# Patient Record
Sex: FEMALE | Race: WHITE | Employment: UNEMPLOYED | URBAN - METROPOLITAN AREA
[De-identification: names, ages, dates, MRNs, and addresses within clinical notes are randomized per-mention and may not be internally consistent; named-entity substitution may affect disease eponyms.]

---

## 2017-01-31 ENCOUNTER — OFFICE VISIT (OUTPATIENT)
Dept: FAMILY MEDICINE CLINIC | Facility: CLINIC | Age: 47
End: 2017-01-31

## 2017-01-31 ENCOUNTER — HOSPITAL ENCOUNTER (OUTPATIENT)
Dept: GENERAL RADIOLOGY | Age: 47
Discharge: HOME | End: 2017-01-31
Payer: MEDICARE

## 2017-01-31 VITALS
DIASTOLIC BLOOD PRESSURE: 77 MMHG | BODY MASS INDEX: 33.29 KG/M2 | SYSTOLIC BLOOD PRESSURE: 128 MMHG | HEART RATE: 76 BPM | HEIGHT: 64 IN | OXYGEN SATURATION: 97 % | RESPIRATION RATE: 16 BRPM | TEMPERATURE: 98.6 F | WEIGHT: 195 LBS

## 2017-01-31 DIAGNOSIS — F32.A DEPRESSION, UNSPECIFIED DEPRESSION TYPE: ICD-10-CM

## 2017-01-31 DIAGNOSIS — M79.644 PAIN IN FINGER OF RIGHT HAND: ICD-10-CM

## 2017-01-31 DIAGNOSIS — N28.89: ICD-10-CM

## 2017-01-31 DIAGNOSIS — M25.441 SWELLING OF FINGER JOINT OF RIGHT HAND: ICD-10-CM

## 2017-01-31 DIAGNOSIS — M32.9 SLE (SYSTEMIC LUPUS ERYTHEMATOSUS) (HCC): Primary | ICD-10-CM

## 2017-01-31 DIAGNOSIS — M32.19: ICD-10-CM

## 2017-01-31 PROCEDURE — 73130 X-RAY EXAM OF HAND: CPT

## 2017-01-31 RX ORDER — PREDNISONE 10 MG/1
TABLET ORAL
Qty: 20 TAB | Refills: 0 | Status: SHIPPED | OUTPATIENT
Start: 2017-01-31 | End: 2017-02-01 | Stop reason: ALTCHOICE

## 2017-01-31 RX ORDER — ACETAMINOPHEN AND CODEINE PHOSPHATE 300; 30 MG/1; MG/1
TABLET ORAL
Refills: 0 | COMMUNITY
Start: 2017-01-02 | End: 2017-03-21 | Stop reason: SDUPTHER

## 2017-01-31 NOTE — PROGRESS NOTES
Chief Complaint   Patient presents with   [employer] Establish Care    Hand Swelling     right x 3 days     HPI:  New patient establishing care  Was seeing Dr May Parra in past  Sees Dr Dagmar Zamarripa with [facility] for kidney disease associated with lupus and hx of kidney transplant, due to see this month and had recent lab work done  She does not see a rheumatologist as she states the last one did nothing for her  She does follow [facility] for her back and joint pains secondary to her Lupus  She was seeing [facility] and would like to go back to follow for her depression. She has a father and mother who are ill. Mother has cancer, going through a recent divorce and dealing with issues from her childhood. She feels overwhelmed. Denies SI and HI.    3 day hx of right hand and finger pain. Denies trauma or fall. Was playing pool, she is right hand dominant although she does use her left hand to write. She lifts her scooter and walker regularly which has not been any change. She does not need a assistive device today. Pain is 11/10 with swelling and pain he joint has gotten progressively more painful, swollen, and red. She describes a \"burning\" when joint is touched and says the pain is moving towards her hand. She's tried ice, an ACE bandage, and tylenol with codeine with minimal relief. She denies any associated symptoms of nausea, vomiting, fever, or diarrhea. Review of Systems   Constitutional: Negative for chills, fever, malaise/fatigue and weight loss. Eyes: Negative for blurred vision, double vision and photophobia. Respiratory: Negative for cough, shortness of breath and wheezing. Cardiovascular: Negative for chest pain, palpitations and orthopnea. Gastrointestinal: Negative for abdominal pain, constipation, diarrhea, nausea and vomiting. Genitourinary: Negative for dysuria, frequency and urgency. Musculoskeletal: Positive for back pain, falls, joint pain and myalgias.    Skin: Negative for itching and rash. Neurological: Negative for dizziness, tingling, sensory change, speech change, focal weakness and headaches. Psychiatric/Behavioral: Positive for depression. Negative for substance abuse and suicidal ideas. The patient has insomnia. The patient is not nervous/anxious. Allergies   Allergen Reactions    Contrast Dye [Iodine] Other (comments)    Sulfa (Sulfonamide Antibiotics) Itching     Past Medical History   Diagnosis Date    Bed wetting     Depression     Hyperparathyroidism due to renal insufficiency (HCC)     Hypertension     Lupus ([facility] [address] 1995    Lupus erythematosus     Synovitis of knee     Tendonitis of knee, left     Tendonitis of knee, right     UTI (urinary tract infection)      Past Surgical History   Procedure Laterality Date    Hx renal transplant      Hx other surgical       Hemodialysis     Family History   Problem Relation Age of Onset    Hypertension Father     Cancer Father     Hypertension Mother     Diabetes Mother     Stroke Mother     Psychiatric Disorder Mother     Psychiatric Disorder Sister      History   Smoking Status    Never Smoker   Smokeless Tobacco    Not on file     Social History     Social History    Marital status: LEGALLY      Spouse name: N/A    Number of children: N/A    Years of education: N/A     Occupational History    Not on file. Social History Main Topics    Smoking status: Never Smoker    Smokeless tobacco: Not on file    Alcohol use No    Drug use: No    Sexual activity: No     Other Topics Concern    Not on file     Social History Narrative         OBJECTIVE:  Visit Vitals    /77    Pulse 76    Temp 98.6 °F (37 °C)    Resp 16    Ht 5' 4\" (1.626 m)    Wt 195 lb (88.5 kg)    SpO2 97%    BMI 33.47 kg/m2     PHYSICAL EXAM:  Physical Exam   Constitutional: She is oriented to person, place, and time. She appears well-developed and well-nourished.    HENT:   Head: Normocephalic and atraumatic. Right Ear: External ear normal.   Left Ear: External ear normal.   Mouth/Throat: Oropharynx is clear and moist.   Eyes: Conjunctivae are normal. Pupils are equal, round, and reactive to light. Neck: Neck supple. Cardiovascular: Normal rate, regular rhythm and intact distal pulses. Pulmonary/Chest: Effort normal and breath sounds normal. No respiratory distress. She has no wheezes. She has no rales. She exhibits no tenderness. Abdominal: Soft. Bowel sounds are normal. She exhibits no distension. There is no tenderness. Musculoskeletal:        Right hand: She exhibits decreased range of motion, tenderness and deformity. She exhibits normal capillary refill. Normal sensation noted. Limited ROM at 3rd digit PIP joint with erythema and swelling   Lymphadenopathy:     She has no cervical adenopathy. Neurological: She is alert and oriented to person, place, and time. Skin: Skin is warm. Psychiatric: She has a normal mood and affect. Her behavior is normal.       ASSESSMENT/PLAN:  Kenia was seen today for establish care and hand swelling. Diagnoses and all orders for this visit:    SLE (systemic lupus erythematosus) ([facility] [address]    Depression, unspecified depression type  -     REFERRAL TO PSYCHIATRY    Kidney disease associated with lupus ([facility] [address]  -     REFERRAL TO NEPHROLOGY    Swelling of finger joint of right hand  -     XR HAND RT MIN 3 V; Future  -     predniSONE (DELTASONE) 10 mg tablet; 4 tabs day 1, 4 tabs day 2, 3 tabs day 3, 3 tabs day 4, 2 tabs day 5, 2 tabs day 6, 1 tab day 7, 1 tab day 8 then return to 5 mg dose    Pain in finger of right hand  -     XR HAND RT MIN 3 V; Future  -     predniSONE (DELTASONE) 10 mg tablet; 4 tabs day 1, 4 tabs day 2, 3 tabs day 3, 3 tabs day 4, 2 tabs day 5, 2 tabs day 6, 1 tab day 7, 1 tab day 8 then return to 5 mg dose     Medical records requested    I have discussed the diagnosis with the patient and the intended plan as seen in the above orders. The patient has received an after-visit summary and questions were answered concerning future plans. I have discussed medication side effects and warnings with the patient as well. I have reviewed the plan of care with the patient, accepted their input and they are in agreement with the treatment goals. Patient verbalizes understanding. Follow-up Disposition:  Return in about 4 weeks (around 2/28/2017), or if symptoms worsen or fail to improve, for finger pain and swelling/ Medicare Wellness.

## 2017-01-31 NOTE — PATIENT INSTRUCTIONS
Recovering From Depression: Care Instructions  Your Care Instructions  Taking good care of yourself is important as you recover from depression. In time, your symptoms will fade as your treatment takes hold. Do not give up. Instead, focus your energy on getting better. Your mood will improve. It just takes some time. Focus on things that can help you feel better, such as being with friends and family, eating well, and getting enough rest. But take things slowly. Do not do too much too soon. You will begin to feel better gradually. Follow-up care is a key part of your treatment and safety. Be sure to make and go to all appointments, and call your doctor if you are having problems. It's also a good idea to know your test results and keep a list of the medicines you take. How can you care for yourself at home? Be realistic  · If you have a large task to do, break it up into smaller steps you can handle, and just do what you can. · You may want to put off important decisions until your depression has lifted. If you have plans that will have a major impact on your life, such as marriage, divorce, or a job change, try to wait a bit. Talk it over with friends and loved ones who can help you look at the overall picture first.  · Reaching out to people for help is important. Do not isolate yourself. Let your family and friends help you. Find someone you can trust and confide in, and talk to that person. · Be patient, and be kind to yourself. Remember that depression is not your fault and is not something you can overcome with willpower alone. Treatment is necessary for depression, just like for any other illness. Feeling better takes time, and your mood will improve little by little. Stay active  · Stay busy and get outside. Take a walk, or try some other light exercise. · Talk with your doctor about an exercise program. Exercise can help with mild depression. · Go to a movie or concert.  Take part in a [employer] activity or other social gathering. Go to a [employer] game. · Ask a friend to have dinner with you. Take care of yourself  · Eat a balanced diet with plenty of fresh fruits and vegetables, whole grains, and lean protein. If you have lost your appetite, eat small snacks rather than large meals. · Avoid drinking alcohol or using illegal drugs. Do not take medicines that have not been prescribed for you. They may interfere with medicines you may be taking for depression, or they may make your depression worse. · Take your medicines exactly as they are prescribed. You may start to feel better within 1 to 3 weeks of taking antidepressant medicine. But it can take as many as 6 to 8 weeks to see more improvement. If you have questions or concerns about your medicines, or if you do not notice any improvement by 3 weeks, talk to your doctor. · If you have any side effects from your medicine, tell your doctor. Antidepressants can make you feel tired, dizzy, or nervous. Some people have dry mouth, constipation, headaches, sexual problems, or diarrhea. Many of these side effects are mild and will go away on their own after you have been taking the medicine for a few weeks. Some may last longer. Talk to your doctor if side effects are bothering you too much. You might be able to try a different medicine. · Get enough sleep. If you have problems sleeping:  ¨ Go to bed at the same time every night, and get up at the same time every morning. ¨ Keep your bedroom dark and quiet. ¨ Do not exercise after 5:00 p.m. ¨ Avoid drinks with caffeine after 5:00 p.m. · Avoid sleeping pills unless they are prescribed by the doctor treating your depression. Sleeping pills may make you groggy during the day, and they may interact with other medicine you are taking. · If you have any other illnesses, such as diabetes, heart disease, or high blood pressure, make sure to continue with your treatment.  Tell your doctor about all of the medicines you take, including those with or without a prescription. · Keep the numbers for these national suicide hotlines: 4-616-095-TALK (4-851.832.1851) and 5-797-RUGPXVZ (5-335.283.5216). If you or someone you know talks about suicide or feeling hopeless, get help right away. When should you call for help? Call 911 anytime you think you may need emergency care. For example, call if:  · You feel like hurting yourself or someone else. · Someone you know has depression and is about to attempt or is attempting suicide. Call your doctor now or seek immediate medical care if:  · You hear voices. · Someone you know has depression and:  ¨ Starts to give away his or her possessions. ¨ Uses illegal drugs or drinks alcohol heavily. ¨ Talks or writes about death, including writing suicide notes or talking about guns, knives, or pills. ¨ Starts to spend a lot of time alone. ¨ Acts very aggressively or suddenly appears calm. Watch closely for changes in your health, and be sure to contact your doctor if:  · You do not get better as expected. Where can you learn more? Go to http://ozzie-diana.info/. Enter N393 in the search box to learn more about \"Recovering From Depression: Care Instructions. \"  Current as of: July 26, 2016  Content Version: 11.1  © 1927-7968 [employer]. Care instructions adapted under license by [employer] (which disclaims liability or warranty for this information). If you have questions about a medical condition or this instruction, always ask your healthcare professional. [place] [address] any warranty or liability for your use of this information. Depression and Chronic Disease: Care Instructions  Your Care Instructions  A chronic disease is one that you have for a long time. Some chronic diseases can be controlled, but they usually cannot be cured.  Depression is common in people with chronic diseases, but it often goes unnoticed. Many people have concerns about seeking treatment for a mental health problem. You may think it's a sign of weakness, or you don't want people to know about it. It's important to overcome these reasons for not seeking treatment. Treating depression or anxiety is good for your health. Follow-up care is a key part of your treatment and safety. Be sure to make and go to all appointments, and call your doctor if you are having problems. It's also a good idea to know your test results and keep a list of the medicines you take. How can you care for yourself at home? Watch for symptoms of depression  The symptoms of depression are often subtle at first. You may think they are caused by your disease rather than depression. Or you may think it is normal to be depressed when you have a chronic disease. If you are depressed you may:  · Feel sad or hopeless. · Feel guilty or worthless. · Not enjoy the things you used to enjoy. · Feel hopeless, as though life is not worth living. · Have trouble thinking or remembering. · Have low energy, and you may not eat or sleep well. · Pull away from others. · Think often about death or killing yourself. (Keep the numbers for these national suicide hotlines: 5-451-854-TALK [1-788.199.7012] and 4-672-WPZOHOT [1-387.417.5991]. )  Get treatment  By treating your depression, you can feel more hopeful and have more energy. If you feel better, you may take better care of yourself, so your health may improve. · Talk to your doctor if you have any changes in mood during treatment for your disease. · Ask your doctor for help. Counseling, antidepressant medicine, or a combination of the two can help most people with depression. Often a combination works best. Counseling can also help you cope with having a chronic disease. When should you call for help? Call 911 anytime you think you may need emergency care.  For example, call if:  · You feel like hurting yourself or someone else.  · Someone you know has depression and is about to attempt or is attempting suicide. Call your doctor now or seek immediate medical care if:  · You hear voices. · Someone you know has depression and:  ¨ Starts to give away his or her possessions. ¨ Uses illegal drugs or drinks alcohol heavily. ¨ Talks or writes about death, including writing suicide notes or talking about guns, knives, or pills. ¨ Starts to spend a lot of time alone. ¨ Acts very aggressively or suddenly appears calm. Watch closely for changes in your health, and be sure to contact your doctor if:  · You do not get better as expected. Where can you learn more? Go to http://ozzie-diana.info/. Enter Q595 in the search box to learn more about \"Depression and Chronic Disease: Care Instructions. \"  Current as of: July 26, 2016  Content Version: 11.1  © 6565-0026 [employer]. Care instructions adapted under license by [employer] (which disclaims liability or warranty for this information). If you have questions about a medical condition or this instruction, always ask your healthcare professional. [address] any warranty or liability for your use of this information.

## 2017-01-31 NOTE — PROGRESS NOTES
HPI:    Diann Mendes is a 41 yo  female presenting as a new patient and to address right hand pain. Pt reports 4 day h/o right hand pain localized to the 3rd digit's PIP. Pt denies inciting injury but she was playing pool the night the pain began. She also cites moving her scooter in and out of her car as another possible cause. The joint has gotten progressively more painful, swollen, and red. She describes a \"burning\" when joint is touched, endorses 10/10 pain, and says the pain is moving towards her hand. She's tried ice, an ACE bandage, and tylenol with codeine with minimal relief. She denies any associated symptoms of nausea, vomiting, fever, or diarrhea. *ATTENTION:  This note has been created by a medical student for educational purposes only. Please do not refer to the content of this note for clinical decision-making, billing, or other purposes. Please see attending physicians note to obtain clinical information on this patient. *

## 2017-01-31 NOTE — MR AVS SNAPSHOT
Visit Information Date & Time Provider Department Dept. Phone Encounter #  
 1/31/2017  2:00 PM Tad Yang MD [facility] [phone] [id number] Follow-up Instructions Return in about 4 weeks (around 2/28/2017), or if symptoms worsen or fail to improve, for finger pain and swelling/ Medicare Wellness. Upcoming Health Maintenance Date Due Pneumococcal 19-64 Highest Risk (1 of 3 - PCV13) 6/3/1990 DTaP/Tdap/Td series (1 - Tdap) 6/3/1992 PAP AKA CERVICAL CYTOLOGY 6/3/1992 INFLUENZA AGE 9 TO ADULT 8/1/2016 Allergies as of 1/31/2017  Review Complete On: 1/31/2017 By: Greg Turner Severity Noted Reaction Type Reactions Contrast Dye [Iodine]  03/05/2014    Other (comments) Sulfa (Sulfonamide Antibiotics)  03/05/2014    Itching Current Immunizations  Never Reviewed No immunizations on file. Not reviewed this visit You Were Diagnosed With   
  
 Codes Comments SLE (systemic lupus erythematosus) (HCC)    -  Primary ICD-10-CM: M32.9 ICD-9-CM: 710.0 Depression, unspecified depression type     ICD-10-CM: F32.9 ICD-9-CM: 902 Kidney disease associated with lupus ([facility] [address]     ICD-10-CM: M32.8 ICD-9-CM: 710.0, 583.81 Swelling of finger joint of right hand     ICD-10-CM: M25.441 ICD-9-CM: 719.04 Pain in finger of right hand     ICD-10-CM: M79.644 ICD-9-CM: 729.5 Vitals BP Pulse Temp Resp Height(growth percentile) Weight(growth percentile) 128/77 76 98.6 °F (37 °C) 16 5' 4\" (1.626 m) 195 lb (88.5 kg) SpO2 BMI OB Status Smoking Status 97% 33.47 kg/m2 Medically Induced Never Smoker Vitals History BMI and BSA Data Body Mass Index Body Surface Area  
 33.47 kg/m 2 2 m 2 Preferred Pharmacy Pharmacy Name Phone [facility] [phone] [facility] [phone] Your Updated Medication List  
  
   
 This list is accurate as of: 17  3:25 PM.  Always use your most recent med list.  
  
  
  
  
 acetaminophen-codeine 300-30 mg per tablet Commonly known as:  TYLENOL #3  
take 1 tablet by mouth twice a day if needed for pain  
  
 atenolol 50 mg tablet Commonly known as:  TENORMIN Take  by mouth daily. azaTHIOprine 50 mg tablet Commonly known as:  [employer] Take 50 mg by mouth daily. * predniSONE 5 mg tablet Commonly known as:  Albertina Roers Take  by mouth daily. * predniSONE 10 mg tablet Commonly known as:  DELTASONE  
4 tabs day 1, 4 tabs day 2, 3 tabs day 3, 3 tabs day 4, 2 tabs day 5, 2 tabs day 6, 1 tab day 7, 1 tab day 8 then return to 5 mg dose PROGRAF 1 mg capsule Generic drug:  tacrolimus Take  by mouth every twelve (12) hours. VITAMIN D3 1,000 unit Cap Generic drug:  cholecalciferol Take 50,000 Units by mouth daily. ZOLOFT 100 mg tablet Generic drug:  sertraline Take  by mouth daily. * Notice: This list has 2 medication(s) that are the same as other medications prescribed for you. Read the directions carefully, and ask your doctor or other care provider to review them with you. Prescriptions Sent to Pharmacy Refills  
 predniSONE (DELTASONE) 10 mg tablet 0 Si tabs day 1, 4 tabs day 2, 3 tabs day 3, 3 tabs day 4, 2 tabs day 5, 2 tabs day 6, 1 tab day 7, 1 tab day 8 then return to 5 mg dose Class: Normal  
 Pharmacy: [address] [place] [address] #: [phone] We Performed the Following REFERRAL TO NEPHROLOGY [BHL87 Custom] Comments:  
 Please evaluate patient for Kidney disease, Hx of lupus. REFERRAL TO PSYCHIATRY [REF91 Custom] Comments:  
 Please evaluate patient for Depression. Follow-up Instructions  Return in about 4 weeks (around 2017), or if symptoms worsen or fail to improve, for finger pain and swelling/ Medicare Wellness. To-Do List   
 01/31/2017 Imaging:  XR HAND RT MIN 3 V Referral Information Referral ID Referred By Referred To  
  
 [id number] Mau Marie Not Available Visits Status Start Date End Date 1 New Request 1/31/17 1/31/18 If your referral has a status of pending review or denied, additional information will be sent to support the outcome of this decision. Referral ID Referred By Referred To  
 [id number] JOHNATHAN, [address] [place] [address] [address] [place], [address] [place] [address] Phone: [phone] Fax: [phone] Visits Status Start Date End Date 1 New Request 1/31/17 1/31/18 If your referral has a status of pending review or denied, additional information will be sent to support the outcome of this decision. Patient Instructions Recovering From Depression: Care Instructions Your Care Instructions Taking good care of yourself is important as you recover from depression. In time, your symptoms will fade as your treatment takes hold. Do not give up. Instead, focus your energy on getting better. Your mood will improve. It just takes some time. Focus on things that can help you feel better, such as being with friends and family, eating well, and getting enough rest. But take things slowly. Do not do too much too soon. You will begin to feel better gradually. Follow-up care is a key part of your treatment and safety. Be sure to make and go to all appointments, and call your doctor if you are having problems. It's also a good idea to know your test results and keep a list of the medicines you take. How can you care for yourself at home? Be realistic · If you have a large task to do, break it up into smaller steps you can handle, and just do what you can.  
· You may want to put off important decisions until your depression has lifted. If you have plans that will have a major impact on your life, such as marriage, divorce, or a job change, try to wait a bit. Talk it over with friends and loved ones who can help you look at the overall picture first. 
· Reaching out to people for help is important. Do not isolate yourself. Let your family and friends help you. Find someone you can trust and confide in, and talk to that person. · Be patient, and be kind to yourself. Remember that depression is not your fault and is not something you can overcome with willpower alone. Treatment is necessary for depression, just like for any other illness. Feeling better takes time, and your mood will improve little by little. Stay active · Stay busy and get outside. Take a walk, or try some other light exercise. · Talk with your doctor about an exercise program. Exercise can help with mild depression. · Go to a movie or concert. Take part in a [employer] activity or other social gathering. Go to a ball game. · Ask a friend to have dinner with you. Take care of yourself · Eat a balanced diet with plenty of fresh fruits and vegetables, whole grains, and lean protein. If you have lost your appetite, eat small snacks rather than large meals. · Avoid drinking alcohol or using illegal drugs. Do not take medicines that have not been prescribed for you. They may interfere with medicines you may be taking for depression, or they may make your depression worse. · Take your medicines exactly as they are prescribed. You may start to feel better within 1 to 3 weeks of taking antidepressant medicine. But it can take as many as 6 to 8 weeks to see more improvement. If you have questions or concerns about your medicines, or if you do not notice any improvement by 3 weeks, talk to your doctor. · If you have any side effects from your medicine, tell your doctor. Antidepressants can make you feel tired, dizzy, or nervous.  Some people have dry mouth, constipation, headaches, sexual problems, or diarrhea. Many of these side effects are mild and will go away on their own after you have been taking the medicine for a few weeks. Some may last longer. Talk to your doctor if side effects are bothering you too much. You might be able to try a different medicine. · Get enough sleep. If you have problems sleeping: ¨ Go to bed at the same time every night, and get up at the same time every morning. ¨ Keep your bedroom dark and quiet. ¨ Do not exercise after 5:00 p.m. ¨ Avoid drinks with caffeine after 5:00 p.m. · Avoid sleeping pills unless they are prescribed by the doctor treating your depression. Sleeping pills may make you groggy during the day, and they may interact with other medicine you are taking. · If you have any other illnesses, such as diabetes, heart disease, or high blood pressure, make sure to continue with your treatment. Tell your doctor about all of the medicines you take, including those with or without a prescription. · Keep the numbers for these national suicide hotlines: 2-272-684-TALK (2-408.594.3885) and 7-991-PVFBPCJ (3-815.502.1259). If you or someone you know talks about suicide or feeling hopeless, get help right away. When should you call for help? Call 911 anytime you think you may need emergency care. For example, call if: 
· You feel like hurting yourself or someone else. · Someone you know has depression and is about to attempt or is attempting suicide. Call your doctor now or seek immediate medical care if: 
· You hear voices. · Someone you know has depression and: 
¨ Starts to give away his or her possessions. ¨ Uses illegal drugs or drinks alcohol heavily. ¨ Talks or writes about death, including writing suicide notes or talking about guns, knives, or pills. ¨ Starts to spend a lot of time alone. ¨ Acts very aggressively or suddenly appears calm. Watch closely for changes in your health, and be sure to contact your doctor if: 
· You do not get better as expected. Where can you learn more? Go to http://ozzie-diana.info/. Enter U185 in the search box to learn more about \"Recovering From Depression: Care Instructions. \" Current as of: July 26, 2016 Content Version: 11.1 © 2069-0919 [employer]. Care instructions adapted under license by [employer] (which disclaims liability or warranty for this information). If you have questions about a medical condition or this instruction, always ask your healthcare professional. [place] [address] any warranty or liability for your use of this information. Depression and Chronic Disease: Care Instructions Your Care Instructions A chronic disease is one that you have for a long time. Some chronic diseases can be controlled, but they usually cannot be cured. Depression is common in people with chronic diseases, but it often goes unnoticed. Many people have concerns about seeking treatment for a mental health problem. You may think it's a sign of weakness, or you don't want people to know about it. It's important to overcome these reasons for not seeking treatment. Treating depression or anxiety is good for your health. Follow-up care is a key part of your treatment and safety. Be sure to make and go to all appointments, and call your doctor if you are having problems. It's also a good idea to know your test results and keep a list of the medicines you take. How can you care for yourself at home? Watch for symptoms of depression The symptoms of depression are often subtle at first. You may think they are caused by your disease rather than depression. Or you may think it is normal to be depressed when you have a chronic disease. If you are depressed you may: · Feel sad or hopeless. · Feel guilty or worthless. · Not enjoy the things you used to enjoy. · Feel hopeless, as though life is not worth living. · Have trouble thinking or remembering. · Have low energy, and you may not eat or sleep well. · Pull away from others. · Think often about death or killing yourself. (Keep the numbers for these national suicide hotlines: 9-878-070-TALK [1-630.136.1782] and 5-596-FDHNHGA [1-778.374.4076]. ) Get treatment By treating your depression, you can feel more hopeful and have more energy. If you feel better, you may take better care of yourself, so your health may improve. · Talk to your doctor if you have any changes in mood during treatment for your disease. · Ask your doctor for help. Counseling, antidepressant medicine, or a combination of the two can help most people with depression. Often a combination works best. Counseling can also help you cope with having a chronic disease. When should you call for help? Call 911 anytime you think you may need emergency care. For example, call if: 
· You feel like hurting yourself or someone else. · Someone you know has depression and is about to attempt or is attempting suicide. Call your doctor now or seek immediate medical care if: 
· You hear voices. · Someone you know has depression and: 
¨ Starts to give away his or her possessions. ¨ Uses illegal drugs or drinks alcohol heavily. ¨ Talks or writes about death, including writing suicide notes or talking about guns, knives, or pills. ¨ Starts to spend a lot of time alone. ¨ Acts very aggressively or suddenly appears calm. Watch closely for changes in your health, and be sure to contact your doctor if: 
· You do not get better as expected. Where can you learn more? Go to http://ozzie-diana.info/. Enter F460 in the search box to learn more about \"Depression and Chronic Disease: Care Instructions. \" Current as of: July 26, 2016 Content Version: 11.1 © 4098-9621 Healthwise[employer] Incorporated. Care instructions adapted under license by [employer] (which disclaims liability or warranty for this information). If you have questions about a medical condition or this instruction, always ask your healthcare professional. [address] any warranty or liability for your use of this information. Introducing [facility]! Tierra Buc[employer] introduces Lopoly patient portal. Now you can access parts of your medical record, email your doctor's office, and request medication refills online. 1. In your internet browser, go to [email or web address] 2. Click on the First Time User? Click Here link in the Sign In box. You will see the New Member Sign Up page. 3. Enter your Lopoly Access Code exactly as it appears below. You will not need to use this code after youve completed the sign-up process. If you do not sign up before the expiration date, you must request a new code. · Lopoly Access Code: [email or web address] Expires: 3/22/2017  2:07 PM 
 
4. Enter the last four digits of your Social Security Number (xxxx) and Date of Birth (mm/dd/yyyy) as indicated and click Submit. You will be taken to the next sign-up page. 5. Create a Lopoly ID. This will be your Lopoly login ID and cannot be changed, so think of one that is secure and easy to remember. 6. Create a Lopoly password. You can change your password at any time. 7. Enter your Password Reset Question and Answer. This can be used at a later time if you forget your password. 8. Enter your e-mail address. You will receive e-mail notification when new information is available in [address] 9. Click Sign Up. You can now view and download portions of your medical record. 10. Click the Download Summary menu link to download a portable copy of your medical information.  
 
If you have questions, please visit the Frequently Asked Questions section of the [employer]. Remember, [email or web address] is NOT to be used for urgent needs. For medical emergencies, dial 911. Now available from your iPhone and Android! Please provide this summary of care documentation to your next provider. Your primary care clinician is listed as [address]. If you have any questions after today's visit, please call [phone].

## 2017-02-01 ENCOUNTER — TELEPHONE (OUTPATIENT)
Dept: FAMILY MEDICINE CLINIC | Facility: CLINIC | Age: 47
End: 2017-02-01

## 2017-02-01 ENCOUNTER — HOSPITAL ENCOUNTER (EMERGENCY)
Age: 47
Discharge: STILL ADMITTED OR ADMINISTRATIVE | End: 2017-02-01
Attending: EMERGENCY MEDICINE

## 2017-02-01 ENCOUNTER — OFFICE VISIT (OUTPATIENT)
Dept: ORTHOPEDIC SURGERY | Age: 47
End: 2017-02-01

## 2017-02-01 VITALS
SYSTOLIC BLOOD PRESSURE: 119 MMHG | HEIGHT: 64 IN | TEMPERATURE: 96.3 F | BODY MASS INDEX: 33.29 KG/M2 | HEART RATE: 75 BPM | DIASTOLIC BLOOD PRESSURE: 71 MMHG | WEIGHT: 195 LBS

## 2017-02-01 DIAGNOSIS — L03.011 CELLULITIS OF FINGER OF RIGHT HAND: Primary | ICD-10-CM

## 2017-02-01 RX ORDER — AMOXICILLIN AND CLAVULANATE POTASSIUM 500; 125 MG/1; MG/1
1 TABLET, FILM COATED ORAL EVERY 12 HOURS
Qty: 14 TAB | Refills: 0 | Status: SHIPPED | OUTPATIENT
Start: 2017-02-01 | End: 2017-02-11

## 2017-02-01 RX ORDER — CEPHALEXIN 500 MG/1
500 CAPSULE ORAL 4 TIMES DAILY
Qty: 40 CAP | Refills: 0 | Status: SHIPPED | OUTPATIENT
Start: 2017-02-01 | End: 2017-02-11

## 2017-02-01 NOTE — PROGRESS NOTES
AMBULATORY PROGRESS NOTE      Patient: Charis Macedo             MRN: [id number]     SSN: [id number] Body mass index is 33.47 kg/(m^2). YOB: 1970     AGE: 40 y.o. EX: female    PCP: Lorrie Hoover MD    IMPRESSION/DIAGNOSIS AND TREATMENT PLAN     DIAGNOSES  1. Cellulitis of finger of right hand        Orders Placed This Encounter    amoxicillin-clavulanate (AUGMENTIN) 500-125 mg per tablet      Charis Macedo understands her diagnoses and the proposed plan. In this individual who has focal redness to her right hand, right middle finger at the middle phalanx only in this region, who has tenderness with some slight redness (who is nontender at the distal phalanx and the proximal phalanx) I am going to put her on an antibiotic. I think she has an early cellulitis of her right hand. She is going to elevate her hand. I gave her an aluminum splint. Any worsening of her baseline condition, of course, over the next 24-48 hours, I need to know right away. Otherwise, I plan on seeing her on Monday, 2017. She had x-rays of her right hand at [facility] on 2017. These studies show swelling to the third finger middle phalanx suggestive cellulitis. Additional recommendations are for MRI with and without gadolinium. She does have lupus. She does have some vascular calcifications in her fingers and some calcific densities to the proximal phalanx of the middle finger. They were felt to represent vascular calcifications and some calcifications involving the flexor tendon sheath or flexor tendon. She has good pulses to her hand. Plan:    1) Augmentin 500 m PO Q12H x7 days    RTO - Monday (2017)    HPI AND EXAMINATION     Kenia Cristobal IS A 40 y.o. female who presents to my outpatient office for a problem visit of right finger pain with possible cellulitis. She presents to the office today ambulating with a single point cane.  She states that she was playing pool on 1/28/2017 when she noticed that her right middle finger was red. She states that she has had significant pain. Patient is allergic to Sulfa drugs. Patient received a kidney transplant in 2008. She also has h/o Lupus and end stage renal disease. [id number] is alert/oriented (name, location, time) and follows commands well. she  is in no acute distress and her affect and mood are appropriate. Cardiovascular/Peripheral Vascular: Normal Pulses to each hand and foot  Musculoskeletal:  LOCATION:   right finger    HAND, WRIST, FOREARM:  right    Integumentary: No rashes, skin patches, wounds, blisters, or abrasions    Warm and normal color. No regions of expressible drainage  Deformities:  Is not present  Swelling: Regions of abnormal swelling: right middle finger at the proximal portion of the phalanx. Mild Redness over the right middle proximal phalanx. Tenderness: Mild Tenderness over the right middle proximal phalanx (only), not distal or proximal phalanx. Motor/Strength/Tone Exam: normal 5/5 strength in all tested muscle groups  Sensory Exam:  no sensory deficits noted  Stability Testing: NONE  ROM: full range with pain  Contractures:  none to affected region   Vascular : Normal capillary refill and digital coloration   No embolic phenomena to the toes or hands   Edema is not present  Neuro Exam:  Sensation : no focal            Motor function: functional    CHART REVIEW     Past Medical History   Diagnosis Date    Bed wetting     Depression     Hyperparathyroidism due to renal insufficiency (HCC)     Hypertension     Lupus ([facility]) 1995    Lupus erythematosus     Synovitis of knee     Tendonitis of knee, left     Tendonitis of knee, right     UTI (urinary tract infection)      Current Outpatient Prescriptions   Medication Sig    amoxicillin-clavulanate (AUGMENTIN) 500-125 mg per tablet Take 1 Tab by mouth every twelve (12) hours for 10 days.     acetaminophen-codeine (TYLENOL #3) 300-30 mg per tablet take 1 tablet by mouth twice a day if needed for pain    azaTHIOprine (IMURAN) 50 mg tablet Take 50 mg by mouth daily.  tacrolimus (PROGRAF) 1 mg capsule Take  by mouth every twelve (12) hours.  sertraline (ZOLOFT) 100 mg tablet Take  by mouth daily.  predniSONE (DELTASONE) 5 mg tablet Take  by mouth daily.  atenolol (TENORMIN) 50 mg tablet Take  by mouth daily.  Cholecalciferol, Vitamin D3, (VITAMIN D3) 1,000 unit cap Take 50,000 Units by mouth daily.  cephALEXin (KEFLEX) 500 mg capsule Take 1 Cap by mouth four (4) times daily for 10 days. No current facility-administered medications for this visit. Allergies   Allergen Reactions    Contrast Dye [Iodine] Other (comments)    Sulfa (Sulfonamide Antibiotics) Itching     Past Surgical History   Procedure Laterality Date    Hx renal transplant      Hx other surgical       Hemodialysis     Social History     Occupational History    Not on file. Social History Main Topics    Smoking status: Never Smoker    Smokeless tobacco: Not on file    Alcohol use No    Drug use: No    Sexual activity: No     Family History   Problem Relation Age of Onset    Hypertension Father     Cancer Father     Hypertension Mother     Diabetes Mother     Stroke Mother     Psychiatric Disorder Mother     Psychiatric Disorder Sister        REVIEW OF SYSTEMS : 2/1/2017  ALL BELOW ARE Negative except : SEE HPI       Constitutional: Negative for fever, chills and weight loss. Neg Weigh Loss  Cardiovascular: Negative for chest pain, claudication and leg swelling. SOB, MCCORMICK   Gastrointestinal: Negative for  pain, N/V/D/C, Blood in stool or urine,dysuria, hematuria,        Incontinence, pelvic pain  Musculoskeletal: see HPI. Neurological: Negative for dizziness and weakness.                  Negative for headaches,Visual Changes, Confusion, Seizures,   Psychiatric/Behavioral: Negative for depression, memory loss and substance abuse. Extremities:  Negative for  hair changes, rash or skin lesion changes. Hematologic: Negative for Bleeding problems, bruising, pallor or swollen lymph nodes.   Peripheral Vascular: No calf pain, vascular vein tenderness to calf pain              No calf throbbing, posterior knee throbbing pain    DIAGNOSTIC IMAGING     No notes on file    Written by Marcella Laird, as dictated by Brayden Chacon MD.

## 2017-02-01 NOTE — PATIENT INSTRUCTIONS
Please follow up on Monday (2/6/2017) . You are advised to contact us if your condition worsens. Cellulitis: Care Instructions  Your Care Instructions    Cellulitis is a skin infection. It often occurs after a break in the skin from a scrape, cut, bite, or puncture, or after a rash. The doctor has checked you carefully, but problems can develop later. If you notice any problems or new symptoms, get medical treatment right away. Follow-up care is a key part of your treatment and safety. Be sure to make and go to all appointments, and call your doctor if you are having problems. It's also a good idea to know your test results and keep a list of the medicines you take. How can you care for yourself at home? · Take your antibiotics as directed. Do not stop taking them just because you feel better. You need to take the full course of antibiotics. · Prop up the infected area on pillows to reduce pain and swelling. Try to keep the area above the level of your heart as often as you can. · If your doctor told you how to care for your wound, follow your doctor's instructions. If you did not get instructions, follow this general advice:  ¨ Wash the wound with clean water 2 times a day. Don't use hydrogen peroxide or alcohol, which can slow healing. ¨ You may cover the wound with a thin layer of petroleum jelly, such as Vaseline, and a nonstick bandage. ¨ Apply more petroleum jelly and replace the bandage as needed. · Be safe with medicines. Take pain medicines exactly as directed. ¨ If the doctor gave you a prescription medicine for pain, take it as prescribed. ¨ If you are not taking a prescription pain medicine, ask your doctor if you can take an over-the-counter medicine. To prevent cellulitis in the future  · Try to prevent cuts, scrapes, or other injuries to your skin. Cellulitis most often occurs where there is a break in the skin.   · If you get a scrape, cut, mild burn, or bite, wash the wound with clean water as soon as you can to help avoid infection. Don't use hydrogen peroxide or alcohol, which can slow healing. · If you have swelling in your legs (edema), support stockings and good skin care may help prevent leg sores and cellulitis. · Take care of your feet, especially if you have diabetes or other conditions that increase the risk of infection. Wear shoes and socks. Do not go barefoot. If you have athlete's foot or other skin problems on your feet, talk to your doctor about how to treat them. When should you call for help? Call your doctor now or seek immediate medical care if:  · You have signs that your infection is getting worse, such as:  ¨ Increased pain, swelling, warmth, or redness. ¨ Red streaks leading from the area. ¨ Pus draining from the area. ¨ A fever. · You get a rash. Watch closely for changes in your health, and be sure to contact your doctor if:  · You are not getting better after 1 day (24 hours). · You do not get better as expected. Where can you learn more? Go to http://ozzie-diana.info/. Ángel Bates in the search box to learn more about \"Cellulitis: Care Instructions. \"  Current as of: February 5, 2016  Content Version: 11.1  © 1654-1595 [employer]. Care instructions adapted under license by [employer] (which disclaims liability or warranty for this information). If you have questions about a medical condition or this instruction, always ask your healthcare professional. [place] [address] any warranty or liability for your use of this information.

## 2017-02-01 NOTE — TELEPHONE ENCOUNTER
Patient is seeing  Dr Iris Hickman with Dr Javier Cortez office today and need insurance referral faxed to  [phone] attention Neftaly Rousseau

## 2017-02-02 ENCOUNTER — TELEPHONE (OUTPATIENT)
Dept: FAMILY MEDICINE CLINIC | Facility: CLINIC | Age: 47
End: 2017-02-02

## 2017-02-02 NOTE — TELEPHONE ENCOUNTER
Patient seen the hand specialists yesterday and was given a different antibiotic. She wants to know if she is supposed to take the prednisone as well. She has a follow up appointment with Dr. Katherine Elliott on Monday, February 06, 2017.

## 2017-03-20 ENCOUNTER — APPOINTMENT (OUTPATIENT)
Dept: GENERAL RADIOLOGY | Age: 47
End: 2017-03-20
Attending: EMERGENCY MEDICINE
Payer: MEDICARE

## 2017-03-20 ENCOUNTER — HOSPITAL ENCOUNTER (EMERGENCY)
Age: 47
Discharge: LEFT BEFORE DISCHARGE | End: 2017-03-21
Attending: EMERGENCY MEDICINE
Payer: MEDICARE

## 2017-03-20 VITALS
TEMPERATURE: 98.8 F | BODY MASS INDEX: 31.65 KG/M2 | SYSTOLIC BLOOD PRESSURE: 168 MMHG | WEIGHT: 190 LBS | HEART RATE: 108 BPM | OXYGEN SATURATION: 96 % | DIASTOLIC BLOOD PRESSURE: 92 MMHG | RESPIRATION RATE: 24 BRPM | HEIGHT: 65 IN

## 2017-03-20 PROCEDURE — 75810000275 HC EMERGENCY DEPT VISIT NO LEVEL OF CARE

## 2017-03-21 ENCOUNTER — HOSPITAL ENCOUNTER (EMERGENCY)
Age: 47
Discharge: HOME | End: 2017-03-21
Attending: EMERGENCY MEDICINE
Payer: SELF-PAY

## 2017-03-21 ENCOUNTER — APPOINTMENT (OUTPATIENT)
Dept: GENERAL RADIOLOGY | Age: 47
End: 2017-03-21
Attending: EMERGENCY MEDICINE
Payer: SELF-PAY

## 2017-03-21 VITALS
OXYGEN SATURATION: 100 % | HEART RATE: 98 BPM | HEIGHT: 65 IN | DIASTOLIC BLOOD PRESSURE: 100 MMHG | TEMPERATURE: 98.5 F | RESPIRATION RATE: 20 BRPM | SYSTOLIC BLOOD PRESSURE: 140 MMHG | WEIGHT: 190 LBS | BODY MASS INDEX: 31.65 KG/M2

## 2017-03-21 DIAGNOSIS — J01.80 OTHER ACUTE SINUSITIS: ICD-10-CM

## 2017-03-21 DIAGNOSIS — J20.9 ACUTE BRONCHITIS WITH BRONCHOSPASM: Primary | ICD-10-CM

## 2017-03-21 DIAGNOSIS — I10 ESSENTIAL HYPERTENSION: ICD-10-CM

## 2017-03-21 PROCEDURE — 99283 EMERGENCY DEPT VISIT LOW MDM: CPT

## 2017-03-21 PROCEDURE — 74011000250 HC RX REV CODE- 250: Performed by: PHYSICIAN ASSISTANT

## 2017-03-21 PROCEDURE — 94640 AIRWAY INHALATION TREATMENT: CPT

## 2017-03-21 PROCEDURE — 74011636637 HC RX REV CODE- 636/637: Performed by: PHYSICIAN ASSISTANT

## 2017-03-21 PROCEDURE — 71020 XR CHEST PA LAT: CPT

## 2017-03-21 PROCEDURE — 77030013140 HC MSK NEB VYRM -A

## 2017-03-21 PROCEDURE — A9270 NON-COVERED ITEM OR SERVICE: HCPCS | Performed by: PHYSICIAN ASSISTANT

## 2017-03-21 RX ORDER — AZITHROMYCIN 250 MG/1
250 TABLET, FILM COATED ORAL DAILY
Qty: 4 TAB | Refills: 0 | Status: SHIPPED | OUTPATIENT
Start: 2017-03-22 | End: 2017-03-26

## 2017-03-21 RX ORDER — PREDNISONE 5 MG/1
5 TABLET ORAL DAILY
COMMUNITY

## 2017-03-21 RX ORDER — PREDNISONE 20 MG/1
60 TABLET ORAL
Status: COMPLETED | OUTPATIENT
Start: 2017-03-21 | End: 2017-03-21

## 2017-03-21 RX ORDER — IPRATROPIUM BROMIDE AND ALBUTEROL SULFATE 2.5; .5 MG/3ML; MG/3ML
3 SOLUTION RESPIRATORY (INHALATION) ONCE
Status: COMPLETED | OUTPATIENT
Start: 2017-03-21 | End: 2017-03-21

## 2017-03-21 RX ORDER — AZATHIOPRINE 50 MG/1
50 TABLET ORAL DAILY
COMMUNITY

## 2017-03-21 RX ORDER — ALBUTEROL SULFATE 90 UG/1
2 AEROSOL, METERED RESPIRATORY (INHALATION)
Qty: 1 INHALER | Refills: 0 | Status: SHIPPED | OUTPATIENT
Start: 2017-03-21 | End: 2018-07-12

## 2017-03-21 RX ORDER — SERTRALINE HYDROCHLORIDE 100 MG/1
1 TABLET, FILM COATED ORAL DAILY
Refills: 0 | COMMUNITY
Start: 2017-01-02

## 2017-03-21 RX ORDER — AZITHROMYCIN 250 MG/1
500 TABLET, FILM COATED ORAL
Status: DISCONTINUED | OUTPATIENT
Start: 2017-03-21 | End: 2017-03-21 | Stop reason: HOSPADM

## 2017-03-21 RX ORDER — SERTRALINE HYDROCHLORIDE 100 MG/1
100 TABLET, FILM COATED ORAL DAILY
COMMUNITY
End: 2017-03-21 | Stop reason: CLARIF

## 2017-03-21 RX ORDER — ATENOLOL 50 MG/1
50 TABLET ORAL DAILY
COMMUNITY
Start: 2011-09-23

## 2017-03-21 RX ORDER — TACROLIMUS 1 MG/1
2-3 CAPSULE ORAL EVERY 12 HOURS
COMMUNITY
Start: 2011-09-23

## 2017-03-21 RX ORDER — PREDNISONE 20 MG/1
TABLET ORAL
Qty: 15 TAB | Refills: 0 | Status: SHIPPED | OUTPATIENT
Start: 2017-03-21 | End: 2018-07-12

## 2017-03-21 RX ORDER — ERGOCALCIFEROL 1.25 MG/1
50000 CAPSULE ORAL
COMMUNITY

## 2017-03-21 RX ORDER — GUAIFENESIN 100 MG/5ML
81 LIQUID (ML) ORAL DAILY
COMMUNITY

## 2017-03-21 RX ADMIN — IPRATROPIUM BROMIDE AND ALBUTEROL SULFATE 3 ML: .5; 3 SOLUTION RESPIRATORY (INHALATION) at 07:32

## 2017-03-21 RX ADMIN — PREDNISONE 60 MG: 20 TABLET ORAL at 07:32

## 2017-03-21 NOTE — ED PROVIDER NOTES
HPI Comments: James Dangelo is a  40 y.o. Obese white female h/o Renal Transplant 9/30/08 d/t ESRD on HD, Glomerulonephritis, MGUS, HTN, SLE, RA, HLP, OAB, Hematuria, UTI, Neuropathy, Hyperparathyroidism d/t Renal Insufficiency, Anxiety, Depression presents to the ED via POV c/o productive cough, sinus pain/congestion/runny nose x 1 week without improvement. Denies fever/chills, HA, dizziness, LOC, ear pain, st, neck pain/stiffness, cp, palp, hemoptysis, sob, wheeze, calf pain/swelling/edema, abd pain, n/v/d. Patient is a 40 y.o. female presenting with cough. Cough   Associated symptoms include rhinorrhea. Pertinent negatives include no chest pain, no chills, no eye redness, no ear pain, no headaches, no sore throat, no shortness of breath, no wheezing, no nausea and no vomiting.         Past Medical History:   Diagnosis Date    Anxiety     Bed wetting     Depression     ESRD (end stage renal disease) (HCC)     Glomerulonephritis     Hematuria     Hyperlipidemia     Hyperparathyroidism due to renal insufficiency (HCC)     Hypertension     Long term (current) use of systemic steroids     [facility])     OAB (overactive bladder)     RA [facility]oid [facility] [address]     Renal transplant recipient 09/30/2008    Nephrologist: Dr. Jesse Quintana SLE (systemic lupus erythematosus) ([facility] [address] 1995    Synovitis of knee     Tendonitis of knee, left     Tendonitis of knee, right     UTI (urinary tract infection)        Past Surgical History:   Procedure Laterality Date    HX OTHER SURGICAL      Hemodialysis    HX RENAL TRANSPLANT  09/30/2008         Family History:   Problem Relation Age of Onset    Hypertension Father     Cancer Father     Hypertension Mother     Diabetes Mother     Stroke Mother     Psychiatric Disorder Mother     Psychiatric Disorder Sister        Social History     Social History    Marital status: LEGALLY      Spouse name: N/A   Rosales Sánchezme Number of children: N/A    Years of education: N/A     Occupational History    Not on file. Social History Main Topics    Smoking status: Never Smoker    Smokeless tobacco: Not on file    Alcohol use No    Drug use: No    Sexual activity: No     Other Topics Concern    Not on file     Social History Narrative         ALLERGIES: Contrast dye [iodine]; Iodinated contrast media - oral and iv dye; Mycophenolate mofetil; and Sulfa (sulfonamide antibiotics)    Review of Systems   Constitutional: Negative for chills, diaphoresis, fatigue and fever. HENT: Positive for congestion, rhinorrhea and sinus pressure. Negative for ear discharge, ear pain, sore throat, trouble swallowing and voice change. Eyes: Negative for pain, discharge, redness, itching and visual disturbance. Respiratory: Positive for cough. Negative for choking, chest tightness, shortness of breath, wheezing and stridor. Cardiovascular: Negative for chest pain, palpitations and leg swelling. Gastrointestinal: Negative for abdominal pain, diarrhea, nausea and vomiting. Genitourinary: Negative for dysuria, frequency and urgency. Musculoskeletal: Negative for arthralgias and joint swelling. Skin: Negative for color change, pallor, rash and wound. Neurological: Negative for dizziness, syncope, weakness, light-headedness and headaches. Psychiatric/Behavioral: Negative for behavioral problems. The patient is not nervous/anxious. Vitals:    03/21/17 0707 03/21/17 0710 03/21/17 0720 03/21/17 0721   BP: (!) 140/104   (!) 140/100   Pulse:    98   Resp:       Temp:    98.5 °F (36.9 °C)   SpO2:  100% 98% 100%   Weight:       Height:                Physical Exam   Constitutional: She is oriented to person, place, and time. She appears well-developed and well-nourished. No distress. HENT:   Head: Normocephalic and atraumatic.    Right Ear: Hearing, tympanic membrane, external ear and ear canal normal.   Left Ear: Hearing, tympanic membrane, external ear and ear canal normal.   Nose: Mucosal edema and rhinorrhea present. Right sinus exhibits maxillary sinus tenderness. Right sinus exhibits no frontal sinus tenderness. Left sinus exhibits maxillary sinus tenderness. Left sinus exhibits no frontal sinus tenderness. Mouth/Throat: Uvula is midline and mucous membranes are normal. No uvula swelling. Posterior oropharyngeal erythema present. No oropharyngeal exudate, posterior oropharyngeal edema or tonsillar abscesses. + Sinus tracking w/ erythema. No exudate. Tonsils flat symmetric. Uvula midline. Airway patent. No PTA. Tolerating secretions. Eyes: Conjunctivae and EOM are normal. Pupils are equal, round, and reactive to light. Right eye exhibits no discharge. Left eye exhibits no discharge. Neck: Trachea normal, normal range of motion, full passive range of motion without pain and phonation normal. Neck supple. No tracheal tenderness, no spinous process tenderness and no muscular tenderness present. No rigidity. No tracheal deviation, no edema, no erythema and normal range of motion present. No thyroid mass and no thyromegaly present. Supple, Symmetric Neck w/o LAD. No stridor. Normal phonation. Cardiovascular: Normal rate, regular rhythm and normal heart sounds. Exam reveals no gallop and no friction rub. No murmur heard. Pulmonary/Chest: Effort normal and breath sounds normal. No accessory muscle usage or stridor. No tachypnea. No respiratory distress. She has no decreased breath sounds. She has no wheezes. She has no rhonchi. She has no rales. Symmetric rise/fall of the chest wall. Speaks in full sentences. Great inspiratory effort. No leg edema/swelling or TTP. Abdominal: Soft. Bowel sounds are normal. There is no tenderness. There is no rigidity, no CVA tenderness, no tenderness at McBurney's point and negative Hensley's sign. Lymphadenopathy:     She has no cervical adenopathy.    Neurological: She is alert and oriented to person, place, and time. She has normal strength. She is not disoriented. No sensory deficit. MS 5/5 BUE/BLE   Skin: Skin is warm, dry and intact. No rash noted. She is not diaphoretic. No cyanosis. No pallor. Psychiatric: She has a normal mood and affect. Her behavior is normal.   Nursing note and vitals reviewed. MDM  Number of Diagnoses or Management Options  Acute bronchitis with bronchospasm: new and requires workup  Essential hypertension: new and requires workup  Other acute sinusitis: new and requires workup  Diagnosis management comments: DDX: Sinusitis, URI, Allergic Rhinitis, Epistaxis, Viral Syndrome, Nasal FB. DDX: Asthma Exacerbation, Status Asthmaticus, Allergic Rhinitis, Sinusitis, Pleuritis, Pleurisy, Pneumothorax, Pneumonia, Bronchitis, Pleurodynia, Pericarditis, Pleural Effusion, Atelectasis, Pericardial Effusion, Gastro-esophageal spasm, Esophagitis, Gastritis, Airway Foreign Body. 7:24 AM: CXR Result: No acute cardiopulmonary process. Duo-Neb x 1 + Prednisone 60 mg PO + Zithromax 500 mg PO. Informed patient to hold Prednisone 5 mg daily dose until Prednisone taper prescribed today has been completed, then resume Prednisone 5 mg PO daily. Reviewed workup results, any meds, and discharge instructions OR admission plan with patient and any family present. Answered all questions. RENETTA Hansen  7:57 AM    PLEASE FOLLOW-UP AS DIRECTED WITHOUT FAIL WITHIN THE TIME FRAME RECOMMENDED AS FAILURE TO DO SO COULD RESULT IN WORSENING OF YOUR PHYSICAL CONDITION, DEATH, AND OR PERMANENT DISABILITY. RETURN TO THE EMERGENCY DEPARTMENT AT IF YOU ARE UNABLE TO FOLLOW-UP AS DIRECTED. RETURN TO THE EMERGENCY DEPARTMENT AT ONCE IF YOU HAVE SYMPTOMS THAT DO NOT IMPROVE WITH TREATMENT, NEW SYMPTOMS, WORSENING SYMPTOMS, OR ANY OTHER CONCERNS. THE PATIENT AGREES WITH THE DISCHARGE PLAN AND FOLLOW-UP INSTRUCTIONS. THE PATIENT AGREES TO REVIEW ALL HANDOUTS. Amount and/or Complexity of Data Reviewed  Tests in the radiology section of CPT®: ordered and reviewed  Discussion of test results with the performing providers: yes  Decide to obtain previous medical records or to obtain history from someone other than the patient: yes  Review and summarize past medical records: yes  Independent visualization of images, tracings, or specimens: yes    Risk of Complications, Morbidity, and/or Mortality  Presenting problems: moderate  Diagnostic procedures: moderate  Management options: moderate    Patient Progress  Patient progress: stable      Procedures    Diagnosis:   1. Acute bronchitis with bronchospasm    2. Other acute sinusitis    3. Essential hypertension          Disposition: HOME    Follow-up Information     Follow up With Details Comments Contact Info    [facility] EMERGENCY DEPT  As needed, If symptoms worsen [address] [facility]    Ry Trejo MD Call today Schedule appointment to be seen within 1 day for evaluation, review imaging results, and recheck blood pressure without fail. [address] [place] [address]  [phone]            Patient's Medications   Start Taking    ALBUTEROL (PROVENTIL HFA, VENTOLIN HFA, PROAIR HFA) 90 MCG/ACTUATION INHALER    Take 2 Puffs by inhalation every four (4) hours as needed for Wheezing or Shortness of Breath (Cough). AZITHROMYCIN (ZITHROMAX) 250 MG TABLET    Take 1 Tab by mouth daily for 4 days. Begin taking tomorrow Wednesday 3/22/17 as you received your initial dose while in the ER. INHALATIONAL SPACING DEVICE    ALWAYS USE WITH INHALER    PREDNISONE (DELTASONE) 20 MG TABLET    Begin taking tomorrow Wednesday 3/22/17 as you received your initial dose while in the ER. 3 tabs po every day x 2 days, then 2 tabs po every day x 3 days, then 1 tab po every day x 3 days.    Continue Taking    ASPIRIN 81 MG CHEWABLE TABLET    Take 81 mg by mouth daily. ATENOLOL (TENORMIN) 50 MG TABLET    Take 50 mg by mouth daily. AZATHIOPRINE (IMURAN) 50 MG TABLET    Take 50 mg by mouth daily. ERGOCALCIFEROL (ERGOCALCIFEROL) 50,000 UNIT CAPSULE    Take 50,000 Units by mouth two (2) days a week. M&F    PREDNISONE (DELTASONE) 5 MG TABLET    Take 5 mg by mouth daily. SERTRALINE (ZOLOFT) 100 MG TABLET    Take 1 Tab by mouth daily. 1/2/17, QTY#90 HARLEEN GUZMÁN    TACROLIMUS (PROGRAF) 1 MG CAPSULE    Take 2-3 mg by mouth every twelve (12) hours. 3 mg QAM, 2 mg QHS   These Medications have changed    No medications on file   Stop Taking    ACETAMINOPHEN-CODEINE (TYLENOL #3) 300-30 MG PER TABLET    take 1 tablet by mouth twice a day if needed for pain    ATENOLOL (TENORMIN) 50 MG TABLET    Take  by mouth daily. AZATHIOPRINE (IMURAN) 50 MG TABLET    Take 50 mg by mouth daily. CHOLECALCIFEROL, VITAMIN D3, (VITAMIN D3) 1,000 UNIT CAP    Take 50,000 Units by mouth daily. PREDNISONE (DELTASONE) 5 MG TABLET    Take  by mouth daily. TACROLIMUS (PROGRAF) 1 MG CAPSULE    Take  by mouth every twelve (12) hours. No results found for this or any previous visit (from the past 24 hour(s)).

## 2017-03-21 NOTE — DISCHARGE INSTRUCTIONS
[employer] Activation    Thank you for requesting access to [employer]. Please follow the instructions below to securely access and download your online medical record. [employer] allows you to send messages to your doctor, view your test results, renew your prescriptions, schedule appointments, and more. How Do I Sign Up? 1. In your internet browser, go to [email or web address]  2. Click on the First Time User? Click Here link in the Sign In box. You will be redirect to the New Member Sign Up page. 3. Enter your [employer] Access Code exactly as it appears below. You will not need to use this code after youve completed the sign-up process. If you do not sign up before the expiration date, you must request a new code. [employer] Access Code: [email or web address]  Expires: 3/22/2017  3:07 PM (This is the date your [employer] access code will )    4. Enter the last four digits of your Social Security Number (xxxx) and Date of Birth (mm/dd/yyyy) as indicated and click Submit. You will be taken to the next sign-up page. 5. Create a [employer]is ID. This will be your [employer] login ID and cannot be changed, so think of one that is secure and easy to remember. 6. Create a [employer] password. You can change your password at any time. 7. Enter your Password Reset Question and Answer. This can be used at a later time if you forget your password. 8. Enter your e-mail address. You will receive e-mail notification when new information is available in [address]. 9. Click Sign Up. You can now view and download portions of your medical record. 10. Click the Download Summary menu link to download a portable copy of your medical information. Additional Information    If you have questions, please visit the Frequently Asked Questions section of the [employer] website at [email or web address] Remember, [employer]is is NOT to be used for urgent needs. For medical emergencies, dial 911.

## 2017-03-21 NOTE — ED TRIAGE NOTES
Patient states that she had an argument with her sister around 2030 and it caused her anxiety to kick. patient states Lupus flare up was triggered also. Patient state she is having a cough with chest congestion.

## 2017-06-12 ENCOUNTER — HOSPITAL ENCOUNTER (EMERGENCY)
Age: 47
Discharge: HOME | End: 2017-06-12
Attending: EMERGENCY MEDICINE
Payer: MEDICARE

## 2017-06-12 ENCOUNTER — APPOINTMENT (OUTPATIENT)
Dept: GENERAL RADIOLOGY | Age: 47
End: 2017-06-12
Attending: PHYSICIAN ASSISTANT
Payer: MEDICARE

## 2017-06-12 VITALS
DIASTOLIC BLOOD PRESSURE: 75 MMHG | RESPIRATION RATE: 18 BRPM | HEART RATE: 87 BPM | TEMPERATURE: 99.1 F | SYSTOLIC BLOOD PRESSURE: 110 MMHG

## 2017-06-12 DIAGNOSIS — M25.561 ACUTE PAIN OF RIGHT KNEE: Primary | ICD-10-CM

## 2017-06-12 PROCEDURE — L1830 KO IMMOB CANVAS LONG PRE OTS: HCPCS

## 2017-06-12 PROCEDURE — 74011250637 HC RX REV CODE- 250/637: Performed by: PHYSICIAN ASSISTANT

## 2017-06-12 PROCEDURE — 99284 EMERGENCY DEPT VISIT MOD MDM: CPT

## 2017-06-12 PROCEDURE — 73562 X-RAY EXAM OF KNEE 3: CPT

## 2017-06-12 RX ORDER — NAPROXEN 500 MG/1
500 TABLET ORAL 2 TIMES DAILY WITH MEALS
Qty: 20 TAB | Refills: 0 | Status: SHIPPED | OUTPATIENT
Start: 2017-06-12 | End: 2017-06-12

## 2017-06-12 RX ORDER — HYDROCODONE BITARTRATE AND ACETAMINOPHEN 5; 325 MG/1; MG/1
1 TABLET ORAL
Status: COMPLETED | OUTPATIENT
Start: 2017-06-12 | End: 2017-06-12

## 2017-06-12 RX ORDER — ACETAMINOPHEN AND CODEINE PHOSPHATE 300; 30 MG/1; MG/1
1 TABLET ORAL
Qty: 12 TAB | Refills: 0 | Status: SHIPPED | OUTPATIENT
Start: 2017-06-12 | End: 2017-08-06

## 2017-06-12 RX ADMIN — HYDROCODONE BITARTRATE AND ACETAMINOPHEN 1 TABLET: 5; 325 TABLET ORAL at 09:32

## 2017-06-12 NOTE — ED TRIAGE NOTES
Per EMS , patient complains of right leg pain starting from her knee going down to her ankle. Started Saturday when she left from [facility]. Hx: Lupus. Vitals stable at this time.

## 2017-06-12 NOTE — ED PROVIDER NOTES
HPI Comments: 9:26 AM 4646 Antonio Rodgers is a 56 y.o. female with a h/o Tendonitis in her knee bilaterally, Synovitis of the knee, SLE, RA, HTN, and ESRD presents to the ED via EMS with complaints of right knee pain that began approximately three nights ago. The patient noted that she can not bare any weight on her right leg. However, she denies any recent injury, trauma, fevers, chills, abdominal pains, urinary symptoms, nausea, vomiting, diarrhea, cough, SOB, or any chest pains. She informed that she did try to relieve her pain with Tylenol #3 but had little to no relief. Also, she notes that if she rest for a day that sometimes helps her pain. The patient had a kidney transplant in 2008, which she denies any complications from. No further symptoms or complaints expressed at this time. The history is provided by the patient.         Past Medical History:   Diagnosis Date    Anxiety     Bed wetting     Depression     [facility]) [facility])     Glomerulonephritis     Hematuria     Hyperlipidemia     Hyperparathyroidism due to renal insufficiency ([facility])     Hypertension     Long term (current) use of systemic steroids     Neuropathy associated with MGUS ([facility])     OAB (overactive bladder)     RA (rheumatoid arthritis) [facility]     Renal transplant recipient 09/30/2008    Nephrologist: Dr. Quang Johansen SLE (systemic lupus erythematosus) ([facility] 1995    Synovitis of knee     Tendonitis of knee, left     Tendonitis of knee, right     UTI (urinary tract infection)        Past Surgical History:   Procedure Laterality Date    HX OTHER SURGICAL      Hemodialysis    HX RENAL TRANSPLANT  09/30/2008         Family History:   Problem Relation Age of Onset    Hypertension Father     Cancer Father     Hypertension Mother     Diabetes Mother     Stroke Mother     Psychiatric Disorder Mother     Psychiatric Disorder Sister        Social History     Social History    Marital status: LEGALLY      Spouse name: N/A    Number of children: N/A    Years of education: N/A     Occupational History    Not on file. Social History Main Topics    Smoking status: Never Smoker    Smokeless tobacco: Not on file    Alcohol use No    Drug use: No    Sexual activity: No     Other Topics Concern    Not on file     Social History Narrative         ALLERGIES: Contrast dye [iodine]; Iodinated contrast media - oral and iv dye; Mycophenolate mofetil; and Sulfa (sulfonamide antibiotics)    Review of Systems   Constitutional: Negative for chills and fever. Respiratory: Negative for shortness of breath. Cardiovascular: Negative for chest pain. Gastrointestinal: Negative for abdominal pain, constipation, diarrhea, nausea and vomiting. Genitourinary: Negative for decreased urine volume, difficulty urinating, dyspareunia, dysuria, enuresis, flank pain, frequency, hematuria and urgency. Musculoskeletal: Positive for arthralgias (right knee pain) and gait problem. Negative for joint swelling. Skin: Negative for rash. All other systems reviewed and are negative. Vitals:    06/12/17 0921   BP: 110/75   Pulse: 87   Resp: 18   Temp: 99.1 °F (37.3 °C)            Physical Exam   Constitutional: She is oriented to person, place, and time. She appears well-developed and well-nourished. No distress. HENT:   Head: Normocephalic and atraumatic. Eyes: Conjunctivae are normal.   Neck: Normal range of motion. Neck supple. Cardiovascular: Normal rate, regular rhythm and normal heart sounds. Pulmonary/Chest: Effort normal and breath sounds normal. No respiratory distress. She exhibits no tenderness. Abdominal: Soft. Bowel sounds are normal. She exhibits no distension. There is no tenderness. There is no rebound and no guarding. Musculoskeletal: Normal range of motion. She exhibits tenderness. She exhibits no edema or deformity. TTP noted with ROM. Negative anterior/posterior drawer. Negative valgus/varus stress. No obvious deformity, edema, ecchymosis, erythema, or overlying skin changes noted on exam.  Pt is ambulatory with even, steady gait, moving BLE with 5/5 strength and FROM against resistance in flexion and extension. Pt is neurovascularly intact distally with cap refill < 3 seconds and intact sensation. Neurological: She is alert and oriented to person, place, and time. She has normal reflexes. Skin: Skin is warm and dry. She is not diaphoretic. Psychiatric: She has a normal mood and affect. Nursing note and vitals reviewed. MDM  Number of Diagnoses or Management Options  Acute pain of right knee: new and requires workup  Diagnosis management comments: Differential Diagnosis: Knee strain, OA, RA, gout, bursitis, bakers cyst, ligamentous/tendinous tear/strain, septic arthritis    Plan:  Pt presents ambulatory in NAD, well-hydrated, non-toxic in appearance, with normal vitals. Exam reveals tenderness with ROM. XR shows tendinous calcifications without acute bony process. Will DC home with knee immobilizer, RICE, ortho follow-up. At this time, patient is stable and appropriate for discharge home. Patient demonstrates understanding of current diagnoses and is in agreement with the treatment plan. They are advised that while the likelihood of serious underlying condition is low at this point given the evaluation performed today, we cannot fully rule it out. They are advised to immediately return with any new symptoms or worsening of current condition. All questions have been answered. Patient is given educational material regarding their diagnoses, including danger symptoms and when to return to the ED. Pt strongly urged to follow-up with Ortho.          Amount and/or Complexity of Data Reviewed  Tests in the radiology section of CPT®: ordered and reviewed  Review and summarize past medical records: yes    Risk of Complications, Morbidity, and/or Mortality  Presenting problems: moderate  Diagnostic procedures: moderate  Management options: moderate    Patient Progress  Patient progress: improved    ED Course       Procedures      -------------------------------------------------------------------------------------------------------------------  Orders:  Orders Placed This Encounter    XR KNEE RT 3 V     Standing Status:   Standing     Number of Occurrences:   1     Order Specific Question:   Transport     Answer:   Stretcher [5]     Order Specific Question:   Reason for Exam     Answer:   pain     Order Specific Question:   Is Patient Pregnant? Answer:   Unknown    KNEE IMMOBILIZER     Standing Status:   Standing     Number of Occurrences:   1    HYDROcodone-acetaminophen (NORCO) 5-325 mg per tablet 1 Tab    naproxen (NAPROSYN) 500 mg tablet     Sig: Take 1 Tab by mouth two (2) times daily (with meals) for 10 days. Dispense:  20 Tab     Refill:  0      Radiology Results:  XR KNEE RT 3 V   Final Result   IMPRESSION:     No evidence of fracture or dislocation at right knee. No definable bony  distortion.     No evidence of effusion in suprapatellar bursa.     Evidence of extensive calcifications, indicating chronic calcific tendinopathy  involving tendon onset the lateral and medial aspects of right knee joint and  involving the ligamentum patellae. Progress Notes:  9:43 AM:  Liliya Casas PA-C was at the pt's bedside, assessed the pt and answered the pt's questions regarding treatment.    -------------------------------------------------------------------------------------------------------------------    Disposition:  Diagnosis:   1.  Acute pain of right knee        Disposition: [place] Home    Follow-up Information     Follow up With Details Comments Contact Info    Mira Wilson MD Call in 1 day As needed for follow-up [address] [id number] [phone]      [facility] Go to As needed, If symptoms worsen [address] [address]  [phone]          Patient's Medications   Start Taking    NAPROXEN (NAPROSYN) 500 MG TABLET    Take 1 Tab by mouth two (2) times daily (with meals) for 10 days. Continue Taking    ALBUTEROL (PROVENTIL HFA, VENTOLIN HFA, PROAIR HFA) 90 MCG/ACTUATION INHALER    Take 2 Puffs by inhalation every four (4) hours as needed for Wheezing or Shortness of Breath (Cough). ASPIRIN 81 MG CHEWABLE TABLET    Take 81 mg by mouth daily. ATENOLOL (TENORMIN) 50 MG TABLET    Take 50 mg by mouth daily. AZATHIOPRINE (IMURAN) 50 MG TABLET    Take 50 mg by mouth daily. ERGOCALCIFEROL (ERGOCALCIFEROL) 50,000 UNIT CAPSULE    Take 50,000 Units by mouth two (2) days a week. M&F    INHALATIONAL SPACING DEVICE    ALWAYS USE WITH INHALER    PREDNISONE (DELTASONE) 20 MG TABLET    Begin taking tomorrow Wednesday 3/22/17 as you received your initial dose while in the ER. 3 tabs po every day x 2 days, then 2 tabs po every day x 3 days, then 1 tab po every day x 3 days. PREDNISONE (DELTASONE) 5 MG TABLET    Take 5 mg by mouth daily. SERTRALINE (ZOLOFT) 100 MG TABLET    Take 1 Tab by mouth daily. 1/2/17, QTY#90 HARLEEN GUZMÁN    TACROLIMUS (PROGRAF) 1 MG CAPSULE    Take 2-3 mg by mouth every twelve (12) hours. 3 mg QAM, 2 mg QHS   These Medications have changed    No medications on file   Stop Taking    No medications on file         Scribe Attestation:   HENRIK Benson am scribing for and in the presence of Tarun Sanchez on this day 06/12/17 at 9:31 AM   Eileen Acosta    Provider Attestation:  Personally performed the services described in the documentation, reviewed the documentation, as recorded by the scribe in my presence, and it accurately and completely records my words and actions.   RENETTA Sanchez. 9:31 AM    Signed by: Eileen Acosta, 06/12/17 , 9:31 AM

## 2017-06-12 NOTE — DISCHARGE INSTRUCTIONS
Knee Pain or Injury: Care Instructions  Your Care Instructions    Injuries are a common cause of knee problems. Sudden (acute) injuries may be caused by a direct blow to the knee. They can also be caused by abnormal twisting, bending, or falling on the knee. Pain, bruising, or swelling may be severe, and may start within minutes of the injury. Overuse is another cause of knee pain. Other causes are climbing stairs, kneeling, and other activities that use the knee. Everyday wear and tear, especially as you get older, also can cause knee pain. Rest, along with home treatment, often relieves pain and allows your knee to heal. If you have a serious knee injury, you may need tests and treatment. Follow-up care is a key part of your treatment and safety. Be sure to make and go to all appointments, and call your doctor if you are having problems. It's also a good idea to know your test results and keep a list of the medicines you take. How can you care for yourself at home? · Be safe with medicines. Read and follow all instructions on the label. ¨ If the doctor gave you a prescription medicine for pain, take it as prescribed. ¨ If you are not taking a prescription pain medicine, ask your doctor if you can take an over-the-counter medicine. · Rest and protect your knee. Take a break from any activity that may cause pain. · Put ice or a cold pack on your knee for 10 to 20 minutes at a time. Put a thin cloth between the ice and your skin. · Prop up a sore knee on a pillow when you ice it or anytime you sit or lie down for the next 3 days. Try to keep it above the level of your heart. This will help reduce swelling. · If your knee is not swollen, you can put moist heat, a heating pad, or a warm cloth on your knee. · If your doctor recommends an elastic bandage, sleeve, or other type of support for your knee, wear it as directed.   · Follow your doctor's instructions about how much weight you can put on your leg. Use a cane, crutches, or a walker as instructed. · Follow your doctor's instructions about activity during your healing process. If you can do mild exercise, slowly increase your activity. · Reach and stay at a healthy weight. Extra weight can strain the joints, especially the knees and hips, and make the pain worse. Losing even a few pounds may help. When should you call for help? Call 911 anytime you think you may need emergency care. For example, call if:  · You have symptoms of a blood clot in your lung (called a pulmonary embolism). These may include:  ¨ Sudden chest pain. ¨ Trouble breathing. ¨ Coughing up blood. Call your doctor now or seek immediate medical care if:  · You have severe or increasing pain. · Your leg or foot turns cold or changes color. · You cannot stand or put weight on your knee. · Your knee looks twisted or bent out of shape. · You cannot move your knee. · You have signs of infection, such as:  ¨ Increased pain, swelling, warmth, or redness. ¨ Red streaks leading from the knee. ¨ Pus draining from a place on your knee. ¨ A fever. · You have signs of a blood clot in your leg (called a deep vein thrombosis), such as:  ¨ Pain in your calf, back of the knee, thigh, or groin. ¨ Redness and swelling in your leg or groin. Watch closely for changes in your health, and be sure to contact your doctor if:  · You have tingling, weakness, or numbness in your knee. · You have any new symptoms, such as swelling. · You have bruises from a knee injury that last longer than 2 weeks. · You do not get better as expected. Where can you learn more? Go to http://ozzie-diana.info/. Enter K195 in the search box to learn more about \"Knee Pain or Injury: Care Instructions. \"  Current as of: May 27, 2016  Content Version: 11.2  © 1882-0006 [employer].  Care instructions adapted under license by [employer] (which disclaims liability or warranty for this information). If you have questions about a medical condition or this instruction, always ask your healthcare professional. [place] [address] any warranty or liability for your use of this information.

## 2017-06-15 ENCOUNTER — OFFICE VISIT (OUTPATIENT)
Dept: ORTHOPEDIC SURGERY | Age: 47
End: 2017-06-15

## 2017-06-15 VITALS
HEIGHT: 65 IN | DIASTOLIC BLOOD PRESSURE: 63 MMHG | TEMPERATURE: 100.8 F | BODY MASS INDEX: 31.59 KG/M2 | WEIGHT: 189.6 LBS | SYSTOLIC BLOOD PRESSURE: 109 MMHG | HEART RATE: 79 BPM

## 2017-06-15 DIAGNOSIS — M17.11 PRIMARY OSTEOARTHRITIS OF RIGHT KNEE: ICD-10-CM

## 2017-06-15 DIAGNOSIS — M70.62 TROCHANTERIC BURSITIS OF LEFT HIP: Primary | ICD-10-CM

## 2017-06-15 DIAGNOSIS — M25.571 CHRONIC PAIN OF RIGHT ANKLE: ICD-10-CM

## 2017-06-15 DIAGNOSIS — G89.29 CHRONIC PAIN OF RIGHT ANKLE: ICD-10-CM

## 2017-06-15 RX ORDER — BETAMETHASONE SODIUM PHOSPHATE AND BETAMETHASONE ACETATE 3; 3 MG/ML; MG/ML
6 INJECTION, SUSPENSION INTRA-ARTICULAR; INTRALESIONAL; INTRAMUSCULAR; SOFT TISSUE ONCE
Qty: 1 VIAL | Refills: 0
Start: 2017-06-15 | End: 2017-06-15

## 2017-06-15 NOTE — PROGRESS NOTES
Patient: Sabino Munguia                MRN: [id number]       SSN: [id number]  YOB: 1970        AGE: 56 y.o. SEX: female  Body mass index is 31.55 kg/(m^2). PCP: Raoul Jamison MD  06/15/17    HISTORY: Ms. Carisa Umaña is a very nice lady with very severe inflammatory arthritis. She does not have a rheumatologist. We are going to get her one. She has had a kidney transplant on Imuran and we are trying to manage her nonoperatively. I think she presents fairly high-risk. Cortisone has not worked for her in the past with regards to the right knee. She would like to try lubricant injections. The pain can be moderate to moderately severe. She is also complaining of some left lateral hip pain, no groin pain. She went to the emergency department for the right knee a couple of months ago. We compared x-rays and no real change. There is a fair bit of myositis ossifications around the knees as well. I am wondering if there is a touch of a neuropathic joint as well. PHYSICAL EXAMINATION:  She is very pleasant. Alert and oriented affect. Both hips rotate fairly well. The right knee has a mild effusion and mild Barry's cyst. Calf is non-tender. Homans sign is negative. Slight evidence of neuropathy distally. Left hip is slightly stiff but not severely so. Fairly well-preserved range of motion. She is tender over the greater trochanter. Low back is mildly tender. RADIOGRAPHS:  I did review her emergency department x-rays that show a bunch of heterotopic ossification and myositis ossificans to a limited degree, and advanced to severe inflammatory arthritis. PROCEDURE:  Under aseptic conditions and after informed, written consent with a time out, the left trochanteric bursa was injected with 1 cc of the Celestone preparation, i.e. 6 mg, which was well tolerated. PLAN:  I think we should try viscosupplementation. She has not tried it before, and other therapies have not helped.  She will return to see us in a couple weeks to start Euflexxa for the right knee. The left hip was injected today with Celestone. REVIEW OF SYSTEMS:      CON: negative for weight loss, fever  EYE: negative for double vision  ENT: negative for hoarseness  RS:   negative for Tb  GI:    negative for blood in stool  :  negative for blood in urine  Other systems reviewed and noted below. Past Medical History:   Diagnosis Date    Anxiety     Bed wetting     Chronic kidney disease     Depression     [facility]     Glomerulonephritis     Hematuria     Hyperlipidemia     Hyperparathyroidism due to renal insufficiency ([facility])     Hypertension     Long term (current) use of systemic steroids     Neuropathy associated with MGUS ([facility])     OAB (overactive bladder)     Osteoporosis     [facility]     Renal transplant recipient 09/30/2008    Nephrologist: Dr. Shamir Rivas     Seizures ([facility]     SLE (systemic lupus erythematosus) ([facility] 1995    Synovitis of knee     Tendonitis of knee, left     Tendonitis of knee, right     UTI (urinary tract infection)        Family History   Problem Relation Age of Onset    Hypertension Father     Cancer Father     Hypertension Mother     Diabetes Mother     Stroke Mother     Psychiatric Disorder Mother     Psychiatric Disorder Sister        Current Outpatient Prescriptions   Medication Sig Dispense Refill    betamethasone (CELESTONE SOLUSPAN) 6 mg/mL injection 1 mL by IntraBURSal route once for 1 dose. 1 Vial 0    acetaminophen-codeine (TYLENOL-CODEINE #3) 300-30 mg per tablet Take 1 Tab by mouth every four (4) hours as needed for Pain. Max Daily Amount: 6 Tabs. 12 Tab 0    aspirin 81 mg chewable tablet Take 81 mg by mouth daily.  ergocalciferol (ERGOCALCIFEROL) 50,000 unit capsule Take 50,000 Units by mouth two (2) days a week. M&F      atenolol (TENORMIN) 50 mg tablet Take 50 mg by mouth daily.  azaTHIOprine (IMURAN) 50 mg tablet Take 50 mg by mouth daily.  predniSONE (DELTASONE) 5 mg tablet Take 5 mg by mouth daily.  tacrolimus (PROGRAF) 1 mg capsule Take 2-3 mg by mouth every twelve (12) hours. 3 mg QAM, 2 mg QHS      predniSONE (DELTASONE) 20 mg tablet Begin taking tomorrow Wednesday 3/22/17 as you received your initial dose while in the ER. 3 tabs po every day x 2 days, then 2 tabs po every day x 3 days, then 1 tab po every day x 3 days. 15 Tab 0    sertraline (ZOLOFT) 100 mg tablet Take 1 Tab by mouth daily. 1/2/17, QTY#90 HARLEEN GUZMÁN  0    albuterol (PROVENTIL HFA, VENTOLIN HFA, PROAIR HFA) 90 mcg/actuation inhaler Take 2 Puffs by inhalation every four (4) hours as needed for Wheezing or Shortness of Breath (Cough). 1 Inhaler 0    inhalational spacing device ALWAYS USE WITH INHALER 1 Device 0       Allergies   Allergen Reactions    Contrast Dye [Iodine] Other (comments)    Iodinated Contrast- Oral And Iv Dye Unknown (comments)    Mycophenolate Mofetil Other (comments)     GI distress-on imuran now    Sulfa (Sulfonamide Antibiotics) Itching and Hives       Past Surgical History:   Procedure Laterality Date    HX OTHER SURGICAL      Hemodialysis    HX RENAL TRANSPLANT  09/30/2008       Social History     Social History    Marital status: LEGALLY      Spouse name: N/A    Number of children: N/A    Years of education: N/A     Occupational History    Not on file.      Social History Main Topics    Smoking status: Never Smoker    Smokeless tobacco: Not on file    Alcohol use No    Drug use: No    Sexual activity: No     Other Topics Concern    Not on file     Social History Narrative       Visit Vitals    /63    Pulse 79    Temp (!) 100.8 °F (38.2 °C) (Oral)    Ht 5' 5\" (1.651 m)    Wt 189 lb 9.6 oz (86 kg)    BMI 31.55 kg/m2         PHYSICAL EXAMINATION:  GENERAL: Alert and oriented x3, in no acute distress, well-developed, well-nourished, afebrile. HEART: No JVD. EYES: No scleral icterus   NECK: No significant lymphadenopathy   LUNGS: No respiratory compromise or indrawing  ABDOMEN: Soft, non-tender, non-distended. Electronically signed by:  Kwasi Palmer MD

## 2017-06-15 NOTE — PATIENT INSTRUCTIONS
Bursitis: Care Instructions  Your Care Instructions  A bursa is a small sac of fluid that helps the tissues around a joint slide over one another easily. Injury or overuse of a joint can cause pain, redness, and inflammation in the bursa (bursitis). Bursitis usually gets better if you avoid the activity that caused it. You can help prevent bursitis from coming back by doing stretching and strengthening exercises. You may also need to change the way you do some activities. Follow-up care is a key part of your treatment and safety. Be sure to make and go to all appointments, and call your doctor if you are having problems. Its also a good idea to know your test results and keep a list of the medicines you take. How can you care for yourself at home? · Put ice or a cold pack on the area for 10 to 20 minutes at a time. Try to do this every 1 to 2 hours for the next 3 days (when you are awake) or until the swelling goes down. Put a thin cloth between the ice and your skin. · After the 3 days of using ice, you may use heat on the area. You can use a hot water bottle; a warm, moist towel; or a heating pad set on low. You can also try alternating heat and ice. · Rest the area where you have pain. Stop any activities that cause pain. Switch to activities that do not stress the area. · Take pain medicines exactly as directed. ¨ If the doctor gave you a prescription medicine for pain, take it as prescribed. ¨ If you are not taking a prescription pain medicine, ask your doctor if you can take an over-the-counter medicine. ¨ Do not take two or more pain medicines at the same time unless the doctor told you to. Many pain medicines have acetaminophen, which is Tylenol. Too much acetaminophen (Tylenol) can be harmful. · To prevent stiffness, gently move the joint as much as you can without pain every day. As the pain gets better, keep doing range-of-motion exercises.  Ask your doctor for exercises that will make the muscles around the joint stronger. Do these as directed. · You can slowly return to the activity that caused the pain, but do it with less effort until you can do it without pain or swelling. Be sure to warm up before and stretch after you do the activity. When should you call for help? Call your doctor now or seek immediate medical care if:  · You get a fever and chills. · You have increased swelling or redness in a joint. · You cannot use a joint, or the pain in a joint gets worse. Watch closely for changes in your health, and be sure to contact your doctor if:  · You have pain for 2 weeks or longer despite home treatment. Where can you learn more? Go to http://ozzie-diana.info/. Enter D345 in the search box to learn more about \"Bursitis: Care Instructions. \"  Current as of: May 23, 2016  Content Version: 11.2  © 9005-9308 [employer]. Care instructions adapted under license by [employer] (which disclaims liability or warranty for this information). If you have questions about a medical condition or this instruction, always ask your healthcare professional. [place] [address] any warranty or liability for your use of this information. Joint Injections: Care Instructions  Your Care Instructions  Joint injections are shots into a joint, such as the knee. They may be used to put in medicines, such as pain relievers. Or they can be used to take out fluid. Sometimes the fluid is tested in a lab. This can help find the cause of a joint problem. A corticosteroid, or steroid, shot is used to reduce inflammation in tendons or joints. It is often used to treat problems such as arthritis, tendinitis, and bursitis. Steroids can be injected directly into a painful, inflamed joint. They can also help reduce inflammation of a bursa. A bursa is a sac of fluid.  It cushions and lubricates areas where tendons, ligaments, skin, muscles, or bones rub against each other. A steroid shot can sometimes help with short-term pain relief when other treatments haven't worked. If steroid shots help, pain may improve for weeks or months. Follow-up care is a key part of your treatment and safety. Be sure to make and go to all appointments, and call your doctor if you are having problems. It's also a good idea to know your test results and keep a list of the medicines you take. How can you care for yourself at home? · Put ice or a cold pack on the area for 10 to 20 minutes at a time. Put a thin cloth between the ice and your skin. · Take anti-inflammatory medicines to reduce pain, swelling, or inflammation. These include ibuprofen (Advil, Motrin) and naproxen (Aleve). Read and follow all instructions on the label. · Avoid strenuous activities for several days, especially those that put stress on the area where you got the shot. · If you have dressings over the area, keep them clean and dry. You may remove them when your doctor tells you to. When should you call for help? Call your doctor now or seek immediate medical care if:  · You have signs of infection, such as:  ¨ Increased pain, swelling, warmth, or redness. ¨ Red streaks leading from the site. ¨ Pus draining from the site. ¨ A fever. Watch closely for changes in your health, and be sure to contact your doctor if you have any problems. Where can you learn more? Go to http://ozzie-diana.info/. Enter N616 in the search box to learn more about \"Joint Injections: Care Instructions. \"  Current as of: May 23, 2016  Content Version: 11.2  © 5163-6725 [employer]. Care instructions adapted under license by [employer] (which disclaims liability or warranty for this information).  If you have questions about a medical condition or this instruction, always ask your healthcare professional. Thang Greene any warranty or liability for your use of this information.

## 2017-06-15 NOTE — MR AVS SNAPSHOT
Visit Information Date & Time Provider Department Dept. Phone Encounter #  
 6/15/2017  4:15 PM Delmer Grey, [address] [facility] [phone] [id number] Upcoming Health Maintenance Date Due Pneumococcal 19-64 Highest Risk (1 of 3 - PCV13) 6/3/1990 DTaP/Tdap/Td series (1 - Tdap) 6/3/1992 PAP AKA CERVICAL CYTOLOGY 6/3/1992 INFLUENZA AGE 9 TO ADULT 8/1/2017 Allergies as of 6/15/2017  Review Complete On: 6/15/2017 By: Delmer Grey MD  
  
 Severity Noted Reaction Type Reactions Contrast Dye [Iodine]  06/16/2009    Other (comments) Iodinated Contrast- Oral And Iv Dye  05/18/2012    Unknown (comments) Mycophenolate Mofetil  10/13/2016    Other (comments) GI distress-on imuran now Sulfa (Sulfonamide Antibiotics)  02/22/2009    Itching, Hives Current Immunizations  Never Reviewed Name Date Influenza Vaccine 9/15/2016 12:00 AM, 10/6/2015 12:00 AM, 10/8/2014 12:00 AM, 9/25/2013 12:00 AM  
  
 Not reviewed this visit You Were Diagnosed With   
  
 Codes Comments Trochanteric bursitis of left hip    -  Primary ICD-10-CM: M70.62 ICD-9-CM: 726.5 Primary osteoarthritis of right knee     ICD-10-CM: M17.11 ICD-9-CM: 715.16 Chronic pain of right ankle     ICD-10-CM: M25.571, G89.29 ICD-9-CM: 719.47, 338.29 Vitals BP Pulse Temp Height(growth percentile) Weight(growth percentile) BMI  
 109/63 79 (!) 100.8 °F (38.2 °C) (Oral) 5' 5\" (1.651 m) 189 lb 9.6 oz (86 kg) 31.55 kg/m2 OB Status Smoking Status Medically Induced Never Smoker BMI and BSA Data Body Mass Index Body Surface Area 31.55 kg/m 2 1.99 m 2 Preferred Pharmacy Pharmacy Name Phone [facility] Lamb [phone] [facility] [phone] Your Updated Medication List  
  
   
This list is accurate as of: 6/15/17  4:43 PM.  Always use your most recent med list.  
 acetaminophen-codeine 300-30 mg per tablet Commonly known as:  TYLENOL-CODEINE #3 Take 1 Tab by mouth every four (4) hours as needed for Pain. Max Daily Amount: 6 Tabs. albuterol 90 mcg/actuation inhaler Commonly known as:  PROVENTIL HFA, VENTOLIN HFA, PROAIR HFA Take 2 Puffs by inhalation every four (4) hours as needed for Wheezing or Shortness of Breath (Cough). aspirin 81 mg chewable tablet Take 81 mg by mouth daily. atenolol 50 mg tablet Commonly known as:  TENORMIN Take 50 mg by mouth daily. azaTHIOprine 50 mg tablet Commonly known as:  [employer] Take 50 mg by mouth daily. betamethasone 6 mg/mL injection Commonly known as:  CELESTONE SOLUSPAN  
1 mL by IntraBURSal route once for 1 dose. ergocalciferol 50,000 unit capsule Commonly known as:  ERGOCALCIFEROL Take 50,000 Units by mouth two (2) days a week. M&F  
  
 inhalational spacing device ALWAYS USE WITH INHALER * predniSONE 5 mg tablet Commonly known as:  Christiano Elodia Take 5 mg by mouth daily. * predniSONE 20 mg tablet Commonly known as:  Christiano Elodia Begin taking tomorrow Wednesday 3/22/17 as you received your initial dose while in the ER. 3 tabs po every day x 2 days, then 2 tabs po every day x 3 days, then 1 tab po every day x 3 days. sertraline 100 mg tablet Commonly known as:  ZOLOFT Take 1 Tab by mouth daily. 1/2/17, QTY#90 HARLEEN GUZMÁN  
  
 tacrolimus 1 mg capsule Commonly known as:  PROGRAF Take 2-3 mg by mouth every twelve (12) hours. 3 mg QAM, 2 mg QHS * Notice: This list has 2 medication(s) that are the same as other medications prescribed for you. Read the directions carefully, and ask your doctor or other care provider to review them with you. We Performed the Following AMB SUPPLY ORDER [[id number] Custom] Comments:  
 Airsport brace for right ankle AMB SUPPLY ORDER [[id number] Custom] Comments: Powered Scooter BETAMETHASONE ACETATE & SODIUM PHOSPHATE INJECTION 3 MG EA. [ [facility]] AL DRAIN/INJECT LARGE JOINT/BURSA [id number] CPT(R)] PROCEDURE AUTHORIZATION TO  [[id number] Custom] Comments:  
 Euflexxa authorization right knee. REFERRAL TO RHEUMATOLOGY [YPA71 Custom] Comments:  
 Referral to Dr. Cayetano Schwartz for evaluation and treatment of rheumatoid arthritis. Referral Information Referral ID Referred By Referred To  
  
 [id number] Magali Longoria Not Available Visits Status Start Date End Date 1 New Request 6/15/17 6/15/18 If your referral has a status of pending review or denied, additional information will be sent to support the outcome of this decision. Patient Instructions Bursitis: Care Instructions Your Care Instructions A bursa is a small sac of fluid that helps the tissues around a joint slide over one another easily. Injury or overuse of a joint can cause pain, redness, and inflammation in the bursa (bursitis). Bursitis usually gets better if you avoid the activity that caused it. You can help prevent bursitis from coming back by doing stretching and strengthening exercises. You may also need to change the way you do some activities. Follow-up care is a key part of your treatment and safety. Be sure to make and go to all appointments, and call your doctor if you are having problems. Its also a good idea to know your test results and keep a list of the medicines you take. How can you care for yourself at home? · Put ice or a cold pack on the area for 10 to 20 minutes at a time. Try to do this every 1 to 2 hours for the next 3 days (when you are awake) or until the swelling goes down. Put a thin cloth between the ice and your skin. · After the 3 days of using ice, you may use heat on the area. You can use a hot water bottle; a warm, moist towel; or a heating pad set on low. You can also try alternating heat and ice. · Rest the area where you have pain. Stop any activities that cause pain. Switch to activities that do not stress the area. · Take pain medicines exactly as directed. ¨ If the doctor gave you a prescription medicine for pain, take it as prescribed. ¨ If you are not taking a prescription pain medicine, ask your doctor if you can take an over-the-counter medicine. ¨ Do not take two or more pain medicines at the same time unless the doctor told you to. Many pain medicines have acetaminophen, which is Tylenol. Too much acetaminophen (Tylenol) can be harmful. · To prevent stiffness, gently move the joint as much as you can without pain every day. As the pain gets better, keep doing range-of-motion exercises. Ask your doctor for exercises that will make the muscles around the joint stronger. Do these as directed. · You can slowly return to the activity that caused the pain, but do it with less effort until you can do it without pain or swelling. Be sure to warm up before and stretch after you do the activity. When should you call for help? Call your doctor now or seek immediate medical care if: 
· You get a fever and chills. · You have increased swelling or redness in a joint. · You cannot use a joint, or the pain in a joint gets worse. Watch closely for changes in your health, and be sure to contact your doctor if: 
· You have pain for 2 weeks or longer despite home treatment. Where can you learn more? Go to http://ozzie-diana.info/. Enter C443 in the search box to learn more about \"Bursitis: Care Instructions. \" Current as of: May 23, 2016 Content Version: 11.2 © 8638-6692 [employer]. Care instructions adapted under license by [employer] (which disclaims liability or warranty for this information).  If you have questions about a medical condition or this instruction, always ask your healthcare professional. Santos Dickson [facility] disclaims any warranty or liability for your use of this information. Joint Injections: Care Instructions Your Care Instructions Joint injections are shots into a joint, such as the knee. They may be used to put in medicines, such as pain relievers. Or they can be used to take out fluid. Sometimes the fluid is tested in a lab. This can help find the cause of a joint problem. A corticosteroid, or steroid, shot is used to reduce inflammation in tendons or joints. It is often used to treat problems such as arthritis, tendinitis, and bursitis. Steroids can be injected directly into a painful, inflamed joint. They can also help reduce inflammation of a bursa. A bursa is a sac of fluid. It cushions and lubricates areas where tendons, ligaments, skin, muscles, or bones rub against each other. A steroid shot can sometimes help with short-term pain relief when other treatments haven't worked. If steroid shots help, pain may improve for weeks or months. Follow-up care is a key part of your treatment and safety. Be sure to make and go to all appointments, and call your doctor if you are having problems. It's also a good idea to know your test results and keep a list of the medicines you take. How can you care for yourself at home? · Put ice or a cold pack on the area for 10 to 20 minutes at a time. Put a thin cloth between the ice and your skin. · Take anti-inflammatory medicines to reduce pain, swelling, or inflammation. These include ibuprofen (Advil, Motrin) and naproxen (Aleve). Read and follow all instructions on the label. · Avoid strenuous activities for several days, especially those that put stress on the area where you got the shot. · If you have dressings over the area, keep them clean and dry. You may remove them when your doctor tells you to. When should you call for help? Call your doctor now or seek immediate medical care if: 
· You have signs of infection, such as: ¨ Increased pain, swelling, warmth, or redness. ¨ Red streaks leading from the site. ¨ Pus draining from the site. ¨ A fever. Watch closely for changes in your health, and be sure to contact your doctor if you have any problems. Where can you learn more? Go to http://ozzie-diana.info/. Enter N616 in the search box to learn more about \"Joint Injections: Care Instructions. \" Current as of: May 23, 2016 Content Version: 11.2 © 6195-1754 [employer]. Care instructions adapted under license by [employer] (which disclaims liability or warranty for this information). If you have questions about a medical condition or this instruction, always ask your healthcare professional. [address] any warranty or liability for your use of this information. Introducing [facility]! Tone Spencer introduces [employer] patient portal. Now you can access parts of your medical record, email your doctor's office, and request medication refills online. 1. In your internet browser, go to [email or web address] 2. Click on the First Time User? Click Here link in the Sign In box. You will see the New Member Sign Up page. 3. Enter your [employer] Access Code exactly as it appears below. You will not need to use this code after youve completed the sign-up process. If you do not sign up before the expiration date, you must request a new code. · [employer] Access Code: [email or web address] Expires: 7/25/2017  8:33 PM 
 
4. Enter the last four digits of your Social Security Number (xxxx) and Date of Birth (mm/dd/yyyy) as indicated and click Submit. You will be taken to the next sign-up page. 5. Create a [employer] ID. This will be your [employer] login ID and cannot be changed, so think of one that is secure and easy to remember. 6. Create a D and K interprisest password. You can change your password at any time. 7. Enter your Password Reset Question and Answer. This can be used at a later time if you forget your password. 8. Enter your e-mail address. You will receive e-mail notification when new information is available in [address] 9. Click Sign Up. You can now view and download portions of your medical record. 10. Click the Download Summary menu link to download a portable copy of your medical information. If you have questions, please visit the Frequently Asked Questions section of the [email or web address] website. Remember, [email or web address] is NOT to be used for urgent needs. For medical emergencies, dial 911. Now available from your iPhone and Android! Please provide this summary of care documentation to your next provider. Your primary care clinician is listed as [facility]. If you have any questions after today's visit, please call [phone].

## 2017-06-16 ENCOUNTER — TELEPHONE (OUTPATIENT)
Dept: ORTHOPEDIC SURGERY | Age: 47
End: 2017-06-16

## 2017-06-22 ENCOUNTER — OFFICE VISIT (OUTPATIENT)
Dept: ORTHOPEDIC SURGERY | Age: 47
End: 2017-06-22

## 2017-06-22 VITALS
TEMPERATURE: 98.4 F | WEIGHT: 187.6 LBS | HEART RATE: 77 BPM | BODY MASS INDEX: 31.25 KG/M2 | DIASTOLIC BLOOD PRESSURE: 83 MMHG | SYSTOLIC BLOOD PRESSURE: 133 MMHG | HEIGHT: 65 IN

## 2017-06-22 DIAGNOSIS — M17.11 PRIMARY OSTEOARTHRITIS OF RIGHT KNEE: Primary | ICD-10-CM

## 2017-06-22 DIAGNOSIS — M17.12 PRIMARY OSTEOARTHRITIS OF LEFT KNEE: ICD-10-CM

## 2017-06-22 RX ORDER — HYALURONATE SODIUM 10 MG/ML
2 SYRINGE (ML) INTRAARTICULAR ONCE
Qty: 2 ML | Refills: 0
Start: 2017-06-22 | End: 2017-06-22

## 2017-06-22 RX ORDER — ACETAMINOPHEN AND CODEINE PHOSPHATE 300; 30 MG/1; MG/1
1 TABLET ORAL
Qty: 21 TAB | Refills: 0 | Status: SHIPPED | OUTPATIENT
Start: 2017-06-22 | End: 2017-08-06

## 2017-06-22 NOTE — MR AVS SNAPSHOT
Visit Information Date & Time Provider Department Dept. Phone Encounter #  
 6/22/2017  1:30 PM Emilia Shah MD [facility] [phone] [id number] Your Appointments 6/28/2017  1:45 PM  
Follow Up with Surekha Cervantes MD  
[facility] ([facility] Appt Note: RT FOOT F/U  
 [address], [address] [facility]  
[phone] [address] [facility], [address]  
  
    
 6/29/2017  1:30 PM  
Follow Up with Emilia Shah MD  
[address] [place] [address], [address] [facility] ([facility]) Appt Note: Euflexxa #2 [address], [address] [facility]  
[phone] [address] [facility], [address]  
  
    
 7/6/2017  1:30 PM  
Follow Up with Emilia Shah MD  
[address] [place] [address], [address] [facility] ([facility]) Appt Note: Euflexxa #3 [address], [address] [facility]  
[phone] Upcoming Health Maintenance Date Due Pneumococcal 19-64 Highest Risk (1 of 3 - PCV13) 6/3/1990 DTaP/Tdap/Td series (1 - Tdap) 6/3/1992 PAP AKA CERVICAL CYTOLOGY 6/3/1992 INFLUENZA AGE 9 TO ADULT 8/1/2017 Allergies as of 6/22/2017  Review Complete On: 6/22/2017 By: Emilia Shah MD  
  
 Severity Noted Reaction Type Reactions Contrast Dye [Iodine]  06/16/2009    Other (comments) Iodinated Contrast- Oral And Iv Dye  05/18/2012    Unknown (comments) Mycophenolate Mofetil  10/13/2016    Other (comments) GI distress-on imuran now Sulfa (Sulfonamide Antibiotics)  02/22/2009    Itching, Hives Current Immunizations  Never Reviewed Name Date Influenza Vaccine 9/15/2016 12:00 AM, 10/6/2015 12:00 AM, 10/8/2014 12:00 AM, 9/25/2013 12:00 AM  
  
 Not reviewed this visit You Were Diagnosed With   
  
 Codes Comments Primary osteoarthritis of right knee    -  Primary ICD-10-CM: M17.11 ICD-9-CM: 715.16 Primary osteoarthritis of left knee     ICD-10-CM: M17.12 
ICD-9-CM: 715.16 Vitals BP Pulse Temp Height(growth percentile) Weight(growth percentile) BMI  
 133/83 77 98.4 °F (36.9 °C) (Oral) 5' 5\" (1.651 m) 187 lb 9.6 oz (85.1 kg) 31.22 kg/m2 OB Status Smoking Status Medically Induced Never Smoker BMI and BSA Data Body Mass Index Body Surface Area  
 31.22 kg/m 2 1.98 m 2 Preferred Pharmacy Pharmacy Name Phone [facility] [phone] [facility] [phone] Your Updated Medication List  
  
   
This list is accurate as of: 6/22/17  2:14 PM.  Always use your most recent med list.  
  
  
  
  
 * acetaminophen-codeine 300-30 mg per tablet Commonly known as:  TYLENOL-CODEINE #3 Take 1 Tab by mouth every four (4) hours as needed for Pain. Max Daily Amount: 6 Tabs. * acetaminophen-codeine 300-30 mg per tablet Commonly known as:  TYLENOL-CODEINE #3 Take 1 Tab by mouth every eight (8) hours as needed for Pain. Max Daily Amount: 3 Tabs. albuterol 90 mcg/actuation inhaler Commonly known as:  PROVENTIL HFA, VENTOLIN HFA, PROAIR HFA Take 2 Puffs by inhalation every four (4) hours as needed for Wheezing or Shortness of Breath (Cough). aspirin 81 mg chewable tablet Take 81 mg by mouth daily. atenolol 50 mg tablet Commonly known as:  TENORMIN Take 50 mg by mouth daily. azaTHIOprine 50 mg tablet Commonly known as:  [employer] Take 50 mg by mouth daily. ergocalciferol 50,000 unit capsule Commonly known as:  ERGOCALCIFEROL Take 50,000 Units by mouth two (2) days a week. M&F  
  
 inhalational spacing device ALWAYS USE WITH INHALER * predniSONE 5 mg tablet Commonly known as:  Lowella Clunes Take 5 mg by mouth daily. * predniSONE 20 mg tablet Commonly known as:  Christella Salts Begin taking tomorrow Wednesday 3/22/17 as you received your initial dose while in the ER. 3 tabs po every day x 2 days, then 2 tabs po every day x 3 days, then 1 tab po every day x 3 days. sertraline 100 mg tablet Commonly known as:  ZOLOFT Take 1 Tab by mouth daily. 1/2/17, QTY#90 ROBI GUZMÁNEL STORMY  
  
 sodium hyaluronate 10 mg/mL Syrg injection Commonly known as:  SUPARTZ FX/HYALGAN/GENIVSC 2 mL by Intra artICUlar route once for 1 dose. tacrolimus 1 mg capsule Commonly known as:  PROGRAF Take 2-3 mg by mouth every twelve (12) hours. 3 mg QAM, 2 mg QHS * Notice: This list has 4 medication(s) that are the same as other medications prescribed for you. Read the directions carefully, and ask your doctor or other care provider to review them with you. Prescriptions Printed Refills  
 acetaminophen-codeine (TYLENOL-CODEINE #3) 300-30 mg per tablet 0 Sig: Take 1 Tab by mouth every eight (8) hours as needed for Pain. Max Daily Amount: 3 Tabs. Class: Print Route: Oral  
  
We Performed the Following EUFLEXXA INJECTION PER DOSE [ [facility]] RI DRAIN/INJECT LARGE JOINT/BURSA [id number] CPT(R)] PROCEDURE AUTHORIZATION TO  [YBH9456 Custom] Comments:  
 Need authorization for left knee Euflexxa injections. Patient Instructions Return to the office next week. Joint Injections: Care Instructions Your Care Instructions Joint injections are shots into a joint, such as the knee. They may be used to put in medicines, such as pain relievers. Or they can be used to take out fluid. Sometimes the fluid is tested in a lab. This can help find the cause of a joint problem. A corticosteroid, or steroid, shot is used to reduce inflammation in tendons or joints. It is often used to treat problems such as arthritis, tendinitis, and bursitis. Steroids can be injected directly into a painful, inflamed joint.  They can also help reduce inflammation of a bursa. A bursa is a sac of fluid. It cushions and lubricates areas where tendons, ligaments, skin, muscles, or bones rub against each other. A steroid shot can sometimes help with short-term pain relief when other treatments haven't worked. If steroid shots help, pain may improve for weeks or months. Follow-up care is a key part of your treatment and safety. Be sure to make and go to all appointments, and call your doctor if you are having problems. It's also a good idea to know your test results and keep a list of the medicines you take. How can you care for yourself at home? · Put ice or a cold pack on the area for 10 to 20 minutes at a time. Put a thin cloth between the ice and your skin. · Take anti-inflammatory medicines to reduce pain, swelling, or inflammation. These include ibuprofen (Advil, Motrin) and naproxen (Aleve). Read and follow all instructions on the label. · Avoid strenuous activities for several days, especially those that put stress on the area where you got the shot. · If you have dressings over the area, keep them clean and dry. You may remove them when your doctor tells you to. When should you call for help? Call your doctor now or seek immediate medical care if: 
· You have signs of infection, such as: 
¨ Increased pain, swelling, warmth, or redness. ¨ Red streaks leading from the site. ¨ Pus draining from the site. ¨ A fever. Watch closely for changes in your health, and be sure to contact your doctor if you have any problems. Where can you learn more? Go to http://ozzie-diana.info/. Enter N616 in the search box to learn more about \"Joint Injections: Care Instructions. \" Current as of: March 21, 2017 Content Version: 11.3 © 9176-8820 [employer], [employer].  Care instructions adapted under license by [employer] (which disclaims liability or warranty for this information). If you have questions about a medical condition or this instruction, always ask your healthcare professional. [address] any warranty or liability for your use of this information. Introducing \[facility] [place] [facility]\"" & [facility] SERVICES! Grayson Jara introduces eMeter patient portal. Now you can access parts of your medical record, email your doctor's office, and request medication refills online. 1. In your internet browser, go to [email or web address] 2. Click on the First Time User? Click Here link in the Sign In box. You will see the New Member Sign Up page. 3. Enter your eMeter Access Code exactly as it appears below. You will not need to use this code after youve completed the sign-up process. If you do not sign up before the expiration date, you must request a new code. · eMeter Access Code: [email or web address] Expires: 7/25/2017  8:33 PM 
 
4. Enter the last four digits of your Social Security Number (xxxx) and Date of Birth (mm/dd/yyyy) as indicated and click Submit. You will be taken to the next sign-up page. 5. Create a eMeter ID. This will be your eMeter login ID and cannot be changed, so think of one that is secure and easy to remember. 6. Create a eMeter password. You can change your password at any time. 7. Enter your Password Reset Question and Answer. This can be used at a later time if you forget your password. 8. Enter your e-mail address. You will receive e-mail notification when new information is available in [address]. 9. Click Sign Up. You can now view and download portions of your medical record. 10. Click the Download Summary menu link to download a portable copy of your medical information. If you have questions, please visit the Frequently Asked Questions section of the eMeter website. Remember, eMeter is NOT to be used for urgent needs. For medical emergencies, dial 911. Now available from your iPhone and Android! Please provide this summary of care documentation to your next provider. Your primary care clinician is listed as Palmer Vasquez. If you have any questions after today's visit, please call [phone].

## 2017-06-22 NOTE — PATIENT INSTRUCTIONS
Return to the office next week. Joint Injections: Care Instructions  Your Care Instructions  Joint injections are shots into a joint, such as the knee. They may be used to put in medicines, such as pain relievers. Or they can be used to take out fluid. Sometimes the fluid is tested in a lab. This can help find the cause of a joint problem. A corticosteroid, or steroid, shot is used to reduce inflammation in tendons or joints. It is often used to treat problems such as arthritis, tendinitis, and bursitis. Steroids can be injected directly into a painful, inflamed joint. They can also help reduce inflammation of a bursa. A bursa is a sac of fluid. It cushions and lubricates areas where tendons, ligaments, skin, muscles, or bones rub against each other. A steroid shot can sometimes help with short-term pain relief when other treatments haven't worked. If steroid shots help, pain may improve for weeks or months. Follow-up care is a key part of your treatment and safety. Be sure to make and go to all appointments, and call your doctor if you are having problems. It's also a good idea to know your test results and keep a list of the medicines you take. How can you care for yourself at home? · Put ice or a cold pack on the area for 10 to 20 minutes at a time. Put a thin cloth between the ice and your skin. · Take anti-inflammatory medicines to reduce pain, swelling, or inflammation. These include ibuprofen (Advil, Motrin) and naproxen (Aleve). Read and follow all instructions on the label. · Avoid strenuous activities for several days, especially those that put stress on the area where you got the shot. · If you have dressings over the area, keep them clean and dry. You may remove them when your doctor tells you to. When should you call for help? Call your doctor now or seek immediate medical care if:  · You have signs of infection, such as:  ¨ Increased pain, swelling, warmth, or redness.   ¨ Red streaks leading from the site. ¨ Pus draining from the site. ¨ A fever. Watch closely for changes in your health, and be sure to contact your doctor if you have any problems. Where can you learn more? Go to http://ozzie-diana.info/. Enter N616 in the search box to learn more about \"Joint Injections: Care Instructions. \"  Current as of: March 21, 2017  Content Version: 11.3  © 6905-3506 [employer]. Care instructions adapted under license by [employer] (which disclaims liability or warranty for this information). If you have questions about a medical condition or this instruction, always ask your healthcare professional. [address] any warranty or liability for your use of this information.

## 2017-06-22 NOTE — PROGRESS NOTES
Patient: Valdez Page                MRN: [id number]       SSN: [id number]  YOB: 1970        AGE: 56 y.o. SEX: female  Body mass index is 31.22 kg/(m^2). PCP: Danielle Fletcher MD  06/22/17  HISTORY: Beryl Gomez is here today for right knee 1st Euflexxa injection. She is also complaining of left knee pain, moderate and aching. Her hip injection has improved. She has inflammatory arthritis and a kidney transplant on Imuran. We are going to try to manage her nonoperatively. The pain in the left knee is similar to the right knee with moderate pain. It is worse with stairs, kneeling, getting up and down from a chair. No true locking or mechanical symptoms. PHYSICAL EXAMINATION:  She is a very pleasant lady in no acute distress. She moves the head and neck adequately. There is no respiratory compromise or indrawing. There is no scleral icterus. There is no JVD. Both hips rotate nicely The tenderness over the trochanter is much improved. Both knees have a little bit of patellofemoral crepitus with terminal extension and mild joint line tenderness in all three compartments. Calf is non tender. Slight evidence of neuropathy distally. RADIOGRAPHS:  Review of x-rays confirms moderate arthritis bilaterally. IMPRESSION:  My overall impression is moderate arthritis bilaterally. PROCEDURE:  Under aseptic conditions and after informed, written consent with a time out, the right knee was injected with the first Euflexxa preparation, which was well tolerated. PLAN:  I would recommend Euflexxa for both knees. We will get it approved for the left knee. She will return to see us in one week to inject both knees at next visit.              REVIEW OF SYSTEMS:      CON: negative for weight loss, fever  EYE: negative for double vision  ENT: negative for hoarseness  RS:   negative for Tb  GI:    negative for blood in stool  :  negative for blood in urine  Other systems reviewed and noted below. Past Medical History:   Diagnosis Date    Anxiety     Bed wetting     Chronic kidney disease     Depression     ESRD (end stage renal disease) [facility])     Glomerulonephritis     Hematuria     Hyperlipidemia     Hyperparathyroidism due to renal insufficiency ([facility])     Hypertension     Long term (current) use of systemic steroids     Neuropathy associated with MGUS ([facility])     OAB (overactive bladder)     Osteoporosis     RA [facility]oid arthritis) ([facility]     Renal transplant recipient 09/30/2008    Nephrologist: Dr. Khan Pin     Seizures ([facility]     SLE (systemic lupus erythematosus) ([facility] 1995    Synovitis of knee     Tendonitis of knee, left     Tendonitis of knee, right     UTI (urinary tract infection)        Family History   Problem Relation Age of Onset    Hypertension Father     Cancer Father     Hypertension Mother     Diabetes Mother     Stroke Mother     Psychiatric Disorder Mother     Psychiatric Disorder Sister        Current Outpatient Prescriptions   Medication Sig Dispense Refill    sodium hyaluronate (SUPARTZ FX/HYALGAN/GENIVSC) 10 mg/mL syrg injection 2 mL by Intra artICUlar route once for 1 dose. 2 mL 0    acetaminophen-codeine (TYLENOL-CODEINE #3) 300-30 mg per tablet Take 1 Tab by mouth every four (4) hours as needed for Pain. Max Daily Amount: 6 Tabs. 12 Tab 0    aspirin 81 mg chewable tablet Take 81 mg by mouth daily.  ergocalciferol (ERGOCALCIFEROL) 50,000 unit capsule Take 50,000 Units by mouth two (2) days a week. M&F      atenolol (TENORMIN) 50 mg tablet Take 50 mg by mouth daily.  azaTHIOprine (IMURAN) 50 mg tablet Take 50 mg by mouth daily.  predniSONE (DELTASONE) 5 mg tablet Take 5 mg by mouth daily.  tacrolimus (PROGRAF) 1 mg capsule Take 2-3 mg by mouth every twelve (12) hours.  3 mg QAM, 2 mg QHS      predniSONE (DELTASONE) 20 mg tablet Begin taking tomorrow Wednesday 3/22/17 as you received your initial dose while in the ER. 3 tabs po every day x 2 days, then 2 tabs po every day x 3 days, then 1 tab po every day x 3 days. 15 Tab 0    sertraline (ZOLOFT) 100 mg tablet Take 1 Tab by mouth daily. 1/2/17, QTY#90 HARLEEN GUZMÁN STORMY  0    albuterol (PROVENTIL HFA, VENTOLIN HFA, PROAIR HFA) 90 mcg/actuation inhaler Take 2 Puffs by inhalation every four (4) hours as needed for Wheezing or Shortness of Breath (Cough). 1 Inhaler 0    inhalational spacing device ALWAYS USE WITH INHALER 1 Device 0       Allergies   Allergen Reactions    Contrast Dye [Iodine] Other (comments)    Iodinated Contrast- Oral And Iv Dye Unknown (comments)    Mycophenolate Mofetil Other (comments)     GI distress-on imuran now    Sulfa (Sulfonamide Antibiotics) Itching and Hives       Past Surgical History:   Procedure Laterality Date    HX OTHER SURGICAL      Hemodialysis    HX RENAL TRANSPLANT  09/30/2008       Social History     Social History    Marital status: LEGALLY      Spouse name: N/A    Number of children: N/A    Years of education: N/A     Occupational History    Not on file. Social History Main Topics    Smoking status: Never Smoker    Smokeless tobacco: Never Used    Alcohol use No    Drug use: No    Sexual activity: No     Other Topics Concern    Not on file     Social History Narrative       Visit Vitals    /83    Pulse 77    Temp 98.4 °F (36.9 °C) (Oral)    Ht 5' 5\" (1.651 m)    Wt 187 lb 9.6 oz (85.1 kg)    BMI 31.22 kg/m2         PHYSICAL EXAMINATION:  GENERAL: Alert and oriented x3, in no acute distress, well-developed, well-nourished, afebrile. HEART: No JVD. EYES: No scleral icterus   NECK: No significant lymphadenopathy   LUNGS: No respiratory compromise or indrawing  ABDOMEN: Soft, non-tender, non-distended. Electronically signed by:  Aleisha Ozuna MD

## 2017-06-29 ENCOUNTER — OFFICE VISIT (OUTPATIENT)
Dept: ORTHOPEDIC SURGERY | Age: 47
End: 2017-06-29

## 2017-06-29 VITALS
TEMPERATURE: 99.2 F | HEART RATE: 91 BPM | BODY MASS INDEX: 31.46 KG/M2 | WEIGHT: 188.8 LBS | DIASTOLIC BLOOD PRESSURE: 77 MMHG | SYSTOLIC BLOOD PRESSURE: 125 MMHG | HEIGHT: 65 IN

## 2017-06-29 DIAGNOSIS — M17.0 PRIMARY OSTEOARTHRITIS OF BOTH KNEES: Primary | ICD-10-CM

## 2017-06-29 RX ORDER — HYALURONATE SODIUM 10 MG/ML
2 SYRINGE (ML) INTRAARTICULAR ONCE
Qty: 2 ML | Refills: 0
Start: 2017-06-29 | End: 2017-06-29

## 2017-06-29 NOTE — PROGRESS NOTES
HISTORY: Cheyanne Wilburn is here today for Euflexxa injections for her left knee first Euflexxa and right knee second Euflexxa. PHYSICAL EXAMINATION:  The previous portal of entry is clean and dry. PROCEDURE:  Under aseptic conditions and after informed, written consent with a time out, each of the two knees is injected with the Euflexxa preparation, which is well tolerated. PLAN:  She is to return to see us next week for number two for the left knee and number three for the right.

## 2017-07-12 ENCOUNTER — OFFICE VISIT (OUTPATIENT)
Dept: ORTHOPEDIC SURGERY | Facility: CLINIC | Age: 47
End: 2017-07-12

## 2017-07-12 VITALS
HEIGHT: 65 IN | TEMPERATURE: 98.8 F | SYSTOLIC BLOOD PRESSURE: 104 MMHG | DIASTOLIC BLOOD PRESSURE: 82 MMHG | BODY MASS INDEX: 31.16 KG/M2 | HEART RATE: 107 BPM | WEIGHT: 187 LBS

## 2017-07-12 DIAGNOSIS — M17.0 PRIMARY OSTEOARTHRITIS OF BOTH KNEES: Primary | ICD-10-CM

## 2017-07-12 RX ORDER — HYALURONATE SODIUM 10 MG/ML
4 SYRINGE (ML) INTRAARTICULAR ONCE
Qty: 4 ML | Refills: 0
Start: 2017-07-12 | End: 2017-07-12

## 2017-07-12 NOTE — PROGRESS NOTES
HISTORY: Adán Iglesias is here today for the right third Euflexxa and left second Euflexxa. She has had no negative sequelae. She is allergic to BETADINE. PROCEDURE:  Under aseptic conditions and after informed, written consent with a time out, each knee was injected with the Euflexxa, which was well tolerated. PLAN:  She will return to see us next week for the left, third injection.

## 2017-07-12 NOTE — MR AVS SNAPSHOT
Visit Information Date & Time Provider Department Dept. Phone Encounter #  
 7/12/2017 10:15 AM Author Arelis  [facility] [address] [facility] [place] [phone] [id number] Upcoming Health Maintenance Date Due Pneumococcal 19-64 Highest Risk (1 of 3 - PCV13) 6/3/1990 DTaP/Tdap/Td series (1 - Tdap) 6/3/1992 PAP AKA CERVICAL CYTOLOGY 6/3/1992 INFLUENZA AGE 9 TO ADULT 8/1/2017 Allergies as of 7/12/2017  Review Complete On: 7/12/2017 By: Author Arelis MD  
  
 Severity Noted Reaction Type Reactions Contrast Dye [Iodine]  06/16/2009    Other (comments) Iodinated Contrast- Oral And Iv Dye  05/18/2012    Unknown (comments) Mycophenolate Mofetil  10/13/2016    Other (comments) GI distress-on imuran now Sulfa (Sulfonamide Antibiotics)  02/22/2009    Itching, Hives Current Immunizations  Never Reviewed Name Date Influenza Vaccine 9/15/2016 12:00 AM, 10/6/2015 12:00 AM, 10/8/2014 12:00 AM, 9/25/2013 12:00 AM  
  
 Not reviewed this visit You Were Diagnosed With   
  
 Codes Comments Primary osteoarthritis of both knees    -  Primary ICD-10-CM: M17.0 ICD-9-CM: 715.16 Vitals BP Pulse Temp Height(growth percentile) Weight(growth percentile) BMI  
 104/82 (!) 107 98.8 °F (37.1 °C) (Oral) 5' 5\" (1.651 m) 187 lb (84.8 kg) 31.12 kg/m2 OB Status Smoking Status Medically Induced Never Smoker BMI and BSA Data Body Mass Index Body Surface Area  
 31.12 kg/m 2 1.97 m 2 Preferred Pharmacy Pharmacy Name Phone [phone] [facility] [phone] Your Updated Medication List  
  
   
This list is accurate as of: 7/12/17 11:25 AM.  Always use your most recent med list.  
  
  
  
  
 * acetaminophen-codeine 300-30 mg per tablet Commonly known as:  TYLENOL-CODEINE #3 Take 1 Tab by mouth every four (4) hours as needed for Pain.  Max Daily Amount: 6 Tabs. * acetaminophen-codeine 300-30 mg per tablet Commonly known as:  TYLENOL-CODEINE #3 Take 1 Tab by mouth every eight (8) hours as needed for Pain. Max Daily Amount: 3 Tabs. albuterol 90 mcg/actuation inhaler Commonly known as:  PROVENTIL HFA, VENTOLIN HFA, PROAIR HFA Take 2 Puffs by inhalation every four (4) hours as needed for Wheezing or Shortness of Breath (Cough). aspirin 81 mg chewable tablet Take 81 mg by mouth daily. atenolol 50 mg tablet Commonly known as:  TENORMIN Take 50 mg by mouth daily. azaTHIOprine 50 mg tablet Commonly known as:  The [employer] Take 50 mg by mouth daily. ergocalciferol 50,000 unit capsule Commonly known as:  ERGOCALCIFEROL Take 50,000 Units by mouth two (2) days a week. M&F  
  
 inhalational spacing device ALWAYS USE WITH INHALER * predniSONE 5 mg tablet Commonly known as:  Jensen Vazquez Take 5 mg by mouth daily. * predniSONE 20 mg tablet Commonly known as:  Jensen Vazquez Begin taking tomorrow Wednesday 3/22/17 as you received your initial dose while in the ER. 3 tabs po every day x 2 days, then 2 tabs po every day x 3 days, then 1 tab po every day x 3 days. sertraline 100 mg tablet Commonly known as:  ZOLOFT Take 1 Tab by mouth daily. 1/2/17, QTY#90 HARLEEN GUZMÁN  
  
 sodium hyaluronate 10 mg/mL Syrg injection Commonly known as:  SUPARTZ FX/HYALGAN/GENIVSC 4 mL by Intra artICUlar route once for 1 dose. tacrolimus 1 mg capsule Commonly known as:  PROGRAF Take 2-3 mg by mouth every twelve (12) hours. 3 mg QAM, 2 mg QHS * Notice: This list has 4 medication(s) that are the same as other medications prescribed for you. Read the directions carefully, and ask your doctor or other care provider to review them with you. We Performed the Following EUFLEXXA INJECTION PER DOSE [ [facility]] TX DRAIN/INJECT LARGE JOINT/BURSA [id number] CPT(R)] To-Do List   
 07/19/2017 4:30 PM  
  Appointment with Vita Jaquez OTR/L at [address] [place] [address] ([phone]) Introducing [facility]! Gwen Azul introduces ExecNote patient portal. Now you can access parts of your medical record, email your doctor's office, and request medication refills online. 1. In your internet browser, go to [email or web address]. [email or web address] 2. Click on the First Time User? Click Here link in the Sign In box. You will see the New Member Sign Up page. 3. Enter your ExecNote Access Code exactly as it appears below. You will not need to use this code after youve completed the sign-up process. If you do not sign up before the expiration date, you must request a new code. · ExecNote Access Code: [email or web address] Expires: 7/25/2017  8:33 PM 
 
4. Enter the last four digits of your Social Security Number (xxxx) and Date of Birth (mm/dd/yyyy) as indicated and click Submit. You will be taken to the next sign-up page. 5. Create a ExecNote ID. This will be your ExecNote login ID and cannot be changed, so think of one that is secure and easy to remember. 6. Create a ExecNote password. You can change your password at any time. 7. Enter your Password Reset Question and Answer. This can be used at a later time if you forget your password. 8. Enter your e-mail address. You will receive e-mail notification when new information is available in [address] 9. Click Sign Up. You can now view and download portions of your medical record. 10. Click the Download Summary menu link to download a portable copy of your medical information. If you have questions, please visit the Frequently Asked Questions section of the ExecNote website. Remember, ExecNote is NOT to be used for urgent needs. For medical emergencies, dial 911. Now available from your iPhone and Android! Please provide this summary of care documentation to your next provider. Your primary care clinician is listed as Terre Seip. If you have any questions after today's visit, please call [phone].

## 2017-07-18 ENCOUNTER — OFFICE VISIT (OUTPATIENT)
Dept: ORTHOPEDIC SURGERY | Facility: CLINIC | Age: 47
End: 2017-07-18

## 2017-07-18 VITALS
HEIGHT: 65 IN | DIASTOLIC BLOOD PRESSURE: 77 MMHG | TEMPERATURE: 99.1 F | WEIGHT: 188 LBS | SYSTOLIC BLOOD PRESSURE: 111 MMHG | HEART RATE: 108 BPM | BODY MASS INDEX: 31.32 KG/M2

## 2017-07-18 DIAGNOSIS — M25.562 LEFT KNEE PAIN, UNSPECIFIED CHRONICITY: Primary | ICD-10-CM

## 2017-07-18 RX ORDER — HYALURONATE SODIUM 10 MG/ML
2 SYRINGE (ML) INTRAARTICULAR ONCE
Qty: 2 ML | Refills: 0
Start: 2017-07-18 | End: 2017-07-18

## 2017-07-18 NOTE — PROGRESS NOTES
HISTORY OF PRESENT ILLNESS:  Kenia Cristobal returns for completion of Euflexxa to the left knee. She has done fair from the prior two injections. She still has pain when she stands for an extended period of time, climbs and descends stairs, or walks any distance. Overall, though, she is hopeful that the third injection will allow for improved range of motion and decreased pain. REVIEW OF SYSTEMS:  No chest pain or shortness of breath. She is a transplant patient. She does have a replaced kidney. She does have lupus. She is not a diabetic. She has an allergy to BETADINE/IODINE CONTRAST. She does use a single-post cane for ambulation assistance. She has no nausea and no vomiting. PHYSICAL EXAM:  She is a 52-year-old, alert and oriented,  female, atraumatic, normocephalic. The left knee reveals no warmth, erythema, or edema. She has a 1+ effusion. There is pain over the medial joint line, which worsens on varus stressing. Active range of motion with the patella tracking midline and crepitation today is 110-5°. Distal sensation is intact fully to the left lower extremity. Capillary refill is brisk and less than 2 seconds to the left lower extremity. IMPRESSION:      1. Left knee pain. 2. Left knee osteoarthritis. 3. Decreased range of motion of the left knee secondary to above. 4. Status post kidney replacement. PROCEDURE:  Today, using sterile technique, after verbal and written consent obtained and appropriate time out performed, 2 cc of Euflexxa, lot number [id number] expiration date August 13, 2018, was administered to left knee using the anterolateral intra-articular approach. There were no complications. The patient tolerated the procedures well     PLAN:  I am currently recommending continuation/completion of Euflexxa today. The patient is to follow back in one month with Dr. Krys Damon. Today, all her questions were answered to her satisfaction.

## 2017-07-19 ENCOUNTER — HOSPITAL ENCOUNTER (OUTPATIENT)
Dept: PHYSICAL THERAPY | Age: 47
Discharge: HOME | End: 2017-07-19
Payer: MEDICARE

## 2017-07-19 PROCEDURE — G8980 MOBILITY D/C STATUS: HCPCS

## 2017-07-19 PROCEDURE — 97165 OT EVAL LOW COMPLEX 30 MIN: CPT

## 2017-07-19 PROCEDURE — G8978 MOBILITY CURRENT STATUS: HCPCS

## 2017-07-19 PROCEDURE — G8979 MOBILITY GOAL STATUS: HCPCS

## 2017-07-19 NOTE — PROGRESS NOTES
OT DAILY TREATMENT NOTE - [facility]     Patient Name: Abbie Garza  Date:2017  : 1970  [x]  Patient  Verified  Payor: Kaye Nunn / Plan: [place] PART A & B / Product Type: Medicare /    In time:445  Out time:515  Total Treatment Time (min): 30  Total Timed Codes (min): 0  1:1 Treatment Time ([address] only): 30   Visit #: 1 of 1    Treatment Area: Muscle weakness (generalized) [M62.81]  Lupus ([address] [M32.9]    SUBJECTIVE  Pain Level (0-10 scale): 8/10  Any medication changes, allergies to medications, adverse drug reactions, diagnosis change, or new procedure performed?: [x] No    [] Yes (see summary sheet for update)  Subjective functional status/changes:   [] No changes reported  I am using [place]  I bought a scooter once, but I got rid of it because I thought I would get better, but I didn't, I am worse. OBJECTIVE  Patient seen for mobility evaluation only    With   [] TE   [] TA   [] neuro   [] other: Patient Education: [x] Review HEP    [] Progressed/Changed HEP based on:   [] positioning   [] body mechanics   [] transfers   [] heat/ice application   [] Splint wear/care   [] Sensory re-education   [] scar management      [] other: Scooter vs wheelchair           Other Objective/Functional Measures:   See mobility evaluation     Pain Level (0-10 scale) post treatment: 8/10    ASSESSMENT/Changes in Function:    [x]  See Plan of Care  []  See progress note/recertification  [x]  See Discharge Summary             PLAN  []  Upgrade activities as tolerated     []  Continue plan of care  []  Update interventions per flow sheet       [x]  Discharge due to:eval only_  []  Other:_      TRACEE Jimenez/L 2017  5:49 PM    No future appointments.

## 2017-07-19 NOTE — PROGRESS NOTES
[facility]  [employer] [place] [employer] [facility] [address]  [phone] fax    Plan of Care/Statement of Necessity for Occupational Therapy Services    Patient name: Shruthi Jaeger Start of Care: 2017   Referral source: Leon Marte MD : 1970    Medical Diagnosis: Muscle weakness (generalized) [M62.81]  Lupus ([address] [M32.9]   Onset Date:    Treatment Diagnosis: Difficulty Walking   Prior Hospitalization: see medical history Provider#: [id number]   Medications: Verified on Patient summary List    Comorbidities: Arthritis, depression, kidney failure, status post transplant   Prior Level of Function: I self care home care driving prior to increased symptoms with lupus, worked as      The [address] [place] [address] and following information is based on the information from the initial evaluation. Assessment/ key information: 56year old RHD female with 20+ year history of lupus resulting in increasing arthritic changes as well as kidney failure requiring transplant. She has been using a rollator but has poor endurance and chronic knee pain of 8/10 which has not been relieved by medication or injections. She is no longer safe for ambulation with this device. She is not a candidate for knee surgery due to kidney transplant. She cannot use a manual wheelchair due to chronic L elbow pain of 3/10, B hand loss of ROM and strength with  of 28# on R and 18# on L. Her BUE strength is 3+/5 but she lacks a tight  with either hand for propulsion due to arthritic changes. She is unable to fully extend either knee and has difficulty with full dorsiflexion in r ankle, making her at risk for falls. Her lower extremity strength is 2+/5 bilaterally. She has difficulty coming to stand without a raised seat or arms for support. She cannot currently perform cooking activities without a wheelchair due to poor standing tolerance and pain.   She cannot perform light housework without a wheelchair due to balance issues and endurance problems. She cannot use a scooter as her apartment  will not accommodate one. Additonally,  her upper extremities fatigue rapidly and she could not keep them in position for scooter use. She requires a power wheelchair to allow her to get to the bathroom safely in order to perform toileting without accidents as well as to allow her to gather items for dressing and bathing and to participate in cooking. Her condition has deteriorated over the last 2 years and is anticipated to further decline as the disease progresses. A power wheelchair will provide her with long term mobility independence as she is moving to her own apartment and will be alone. Evaluation Complexity: History LOW Complexity : Brief history review  Examination LOW Complexity : 1-3 performance deficits relating to physical, cognitive , or psychosocial skils that result in activity limitations and / or participation restrictions  Clinical Decision Making LOW Complexity : No comorbidities that affect functional and no verbal or physical assistance needed to complete eval tasks   Overall Complexity Rating: LOW     Problem List: Pain effecting function, Decreased range of motion, Decreased strength, Decreased coordination/prehension, Decreased ADL/functional abilities , Decreased activity tolerance and Decreased transfer abilities     Treatment Plan may include any combination of the following: Patient education    Patient / Family readiness to learn indicated by: asking questions, trying to perform skills and interest    Persons(s) to be included in education:   patient (P)    Barriers to Learning/Limitations: None    Patient Goal (s): Be able to get around better    Patient Self Reported Health Status: good    Rehabilitation Potential: good    Short Term Goals: To be accomplished in 1 treatments:  1.   Evaluate patient for need for power mobility and make recommendations    Frequency / Duration: Patient to be seen 1 times per week for 1 treatments:    Patient/ Caregiver education and instruction: Diagnosis, prognosis, other order process  [x]  Plan of care has been reviewed with LEHMAN    Mobility   Current  CL= 60-79%   Goal  CL= 60-79%  D/C  CL= 60-79%      The severity rating is based on clinical judgment and the FOTO score. Certification Period: 7/19/17-7/31/17    TRACEE Ndiaye/L 7/19/2017 6:19 PM      ________________________________________________________________________    I certify that the above Therapy Services are being furnished while the patient is under my care. I agree with the treatment plan and certify that this therapy is necessary.     Physician's Signature:____________________Date:____________Time:__________    Please sign and return to [facility] [employer] [facility]   [address] [place] [address]  [phone] fax

## 2017-08-06 ENCOUNTER — APPOINTMENT (OUTPATIENT)
Dept: GENERAL RADIOLOGY | Age: 47
End: 2017-08-06
Attending: PHYSICIAN ASSISTANT
Payer: MEDICARE

## 2017-08-06 ENCOUNTER — HOSPITAL ENCOUNTER (EMERGENCY)
Age: 47
Discharge: HOME | End: 2017-08-06
Attending: EMERGENCY MEDICINE
Payer: MEDICARE

## 2017-08-06 VITALS
HEIGHT: 65 IN | SYSTOLIC BLOOD PRESSURE: 125 MMHG | TEMPERATURE: 98.6 F | BODY MASS INDEX: 29.99 KG/M2 | RESPIRATION RATE: 16 BRPM | HEART RATE: 71 BPM | WEIGHT: 180 LBS | OXYGEN SATURATION: 93 % | DIASTOLIC BLOOD PRESSURE: 74 MMHG

## 2017-08-06 DIAGNOSIS — M06.9 RHEUMATOID ARTHRITIS INVOLVING LEFT SHOULDER, UNSPECIFIED RHEUMATOID FACTOR PRESENCE: Primary | ICD-10-CM

## 2017-08-06 PROCEDURE — 74011250637 HC RX REV CODE- 250/637: Performed by: EMERGENCY MEDICINE

## 2017-08-06 PROCEDURE — 93005 ELECTROCARDIOGRAM TRACING: CPT

## 2017-08-06 PROCEDURE — 99284 EMERGENCY DEPT VISIT MOD MDM: CPT

## 2017-08-06 RX ORDER — ACETAMINOPHEN AND CODEINE PHOSPHATE 300; 30 MG/1; MG/1
1 TABLET ORAL
Status: COMPLETED | OUTPATIENT
Start: 2017-08-06 | End: 2017-08-06

## 2017-08-06 RX ORDER — ACETAMINOPHEN AND CODEINE PHOSPHATE 300; 30 MG/1; MG/1
1 TABLET ORAL
Qty: 12 TAB | Refills: 0 | Status: SHIPPED | OUTPATIENT
Start: 2017-08-06 | End: 2018-07-12 | Stop reason: SDUPTHER

## 2017-08-06 RX ADMIN — ACETAMINOPHEN AND CODEINE PHOSPHATE 1 TABLET: 300; 30 TABLET ORAL at 15:11

## 2017-08-06 NOTE — DISCHARGE INSTRUCTIONS
Rheumatoid Arthritis: Care Instructions  Your Care Instructions    Arthritis is a common health problem in which the joints are inflamed. There are many types of arthritis. In rheumatoid arthritis, the body's own immune system attacks the joints. This causes pain, stiffness, and swelling in the joints, especially in the hands and feet. It can become hard to open jars, write, and do other daily tasks. Sometimes rheumatoid arthritis can also cause bumps to form under the skin. Over time, rheumatoid arthritis can damage and deform joints. Early treatment with medicines may reduce your chances of having a lasting disability. Follow-up care is a key part of your treatment and safety. Be sure to make and go to all appointments, and call your doctor if you are having problems. Its also a good idea to know your test results and keep a list of the medicines you take. How can you care for yourself at home? · If your doctor recommends it, get more exercise. Walking is a good choice. If your knees or ankles hurt, try riding a stationary bike or swimming. · Move each joint gently through its full range of motion once or twice a day. · Rest joints when they are sore or overworked. Short rest breaks may help more than staying in bed. · Reach and stay at a healthy weight. Regular exercise and a healthy diet will help you do this. Extra weight can strain the joints, especially the knees and hips, and make the pain worse. Losing even a few pounds may help. · Get enough calcium and vitamin D to help prevent osteoporosis, which causes thin bones. Talk to your doctor about how much you should take. · Protect your joints from injury. Do not overuse them. Try to limit or avoid activities that cause joint pain or swelling. Use special kitchen tools and other self-help devices as well as walkers, splints, or canes if needed. · Use heat to ease pain. Take warm showers or baths. Use hot packs or a heating pad set on low.  Sleep under a warm electric blanket. · Put ice or a cold pack on the area for 10 to 20 minutes at a time. Put a thin cloth between the ice and your skin. · Take pain medicines exactly as directed. ¨ If the doctor gave you a prescription medicine for pain, take it as prescribed. ¨ If you are not taking a prescription pain medicine, ask your doctor if you can take an over-the-counter medicine. · Take an active role in managing your condition. Set up a treatment plan with your doctor, and learn as much as you can about rheumatoid arthritis. This will help you control pain and stay active. When should you call for help? Call your doctor now or seek immediate medical care if:  · You have a fever or a rash along with joint pain. · You have joint pain that is so severe that you cannot use the joint at all. · You have sudden swelling, redness, or pain in one or more joints, and you do not know why. · You have back or neck pain along with weakness in your arms or legs. · You have a loss of bowel or bladder control. Watch closely for changes in your health, and be sure to contact your doctor if:  · You have joint pain that lasts for more than 6 weeks. · You have side effects from your arthritis medicines, such as stomach pain, nausea, heartburn, or dark and tarlike stools. Where can you learn more? Go to http://ozzie-diana.info/. Enter K205 in the search box to learn more about \"Rheumatoid Arthritis: Care Instructions. \"  Current as of: October 31, 2016  Content Version: 11.3  © 7487-3936 [employer]. Care instructions adapted under license by [employer] (which disclaims liability or warranty for this information). If you have questions about a medical condition or this instruction, always ask your healthcare professional. [address] any warranty or liability for your use of this information.

## 2017-08-06 NOTE — ED NOTES
I performed a brief evaluation, including history and physical, of the patient here in triage and I have determined that pt will need further treatment and evaluation from the main side ER physician. I have placed initial orders to help in expediting patients care. August 06, 2017 at 2:42 PM - José Luis Brody PA-C        There were no vitals taken for this visit.

## 2017-08-06 NOTE — ED PROVIDER NOTES
HPI Comments: 2:50 PM [address] Antonio Rodgers is a 56 y.o. female with a hx of lupus, kidney transplant (followed by Leeanna Jaffe and her Nephrologist Dr. Jimmy Ng), ESRD, HTN, RA, and seizures who presents to the ED c/o left shoulder pain that started this morning. She states that the pain feels similar to her rheumatoid arthritis pain; however, it is worse than any previous RA pain. She is on Prednisone and Tylenol #3 for her rheumatoid arthritis, but she has not taken any Tylenol #3 for the past month as she has not needed it. She is not on Humira, Methotrexate, or any any anti-inflammatories. She is on Prograft and Imuran for her transplant. She states that her RA is followed by her Nephrologist, Dr. Maria De Jesus Torres. She does notes some chronic, mild SOB on exertion which she attributes to her depression and anxiety, but it is no worse than prior. She denies fever, N/V/D, chest pain, and any further complaints. The history is provided by the patient.         Past Medical History:   Diagnosis Date    Anxiety     Bed wetting     Chronic kidney disease     Depression     ESRD (end stage renal disease) (HCC)     Glomerulonephritis     Hematuria     Hyperlipidemia     Hyperparathyroidism due to renal insufficiency (HCC)     Hypertension     Long term (current) use of systemic steroids     [facility]US ([facility])     OAB (overactive bladder)     Osteoporosis     [facility]oid [facility]) [facility]     Renal transplant recipient 09/30/2008    Nephrologist: Dr. Yolanda Vogt Seizures ([facility]     SLE (systemic lupus erythematosus) ([facility] 1995    Synovitis of knee     Tendonitis of knee, left     Tendonitis of knee, right     UTI (urinary tract infection)        Past Surgical History:   Procedure Laterality Date    HX OTHER SURGICAL      Hemodialysis    HX RENAL TRANSPLANT  09/30/2008         Family History:   Problem Relation Age of Onset    Hypertension Father     Cancer Father    Shilpa Dunbar Hypertension Mother     Diabetes Mother     Stroke Mother     Psychiatric Disorder Mother     Psychiatric Disorder Sister        Social History     Social History    Marital status: LEGALLY      Spouse name: N/A    Number of children: N/A    Years of education: N/A     Occupational History    Not on file. Social History Main Topics    Smoking status: Never Smoker    Smokeless tobacco: Never Used    Alcohol use No    Drug use: No    Sexual activity: No     Other Topics Concern    Not on file     Social History Narrative         ALLERGIES: Contrast dye [iodine]; Iodinated contrast- oral and iv dye; Mycophenolate mofetil; and Sulfa (sulfonamide antibiotics)    Review of Systems   Constitutional: Negative for chills and fever. HENT: Negative for congestion and sore throat. Respiratory: Positive for shortness of breath (chronic). Negative for cough. Cardiovascular: Negative for chest pain and leg swelling. Gastrointestinal: Negative for abdominal pain and nausea. Genitourinary: Negative for dysuria and hematuria. Musculoskeletal: Positive for arthralgias (left shoulder pain). Negative for back pain. Skin: Negative for rash and wound. Allergic/Immunologic: Positive for immunocompromised state. Neurological: Negative for syncope, light-headedness and headaches. Psychiatric/Behavioral: Negative for behavioral problems. The patient is nervous/anxious. All other systems reviewed and are negative. Vitals:    08/06/17 1442   Pulse: 70   Resp: 18   SpO2: 98%            Physical Exam   Constitutional: She appears well-developed and well-nourished. Non-toxic appearance. She does not have a sickly appearance. She does not appear ill. No distress. HENT:   Head: Normocephalic and atraumatic. Mouth/Throat: Oropharynx is clear and moist. No oropharyngeal exudate. Eyes: Conjunctivae and EOM are normal. Pupils are equal, round, and reactive to light. No scleral icterus. Neck: Normal range of motion. Neck supple. No hepatojugular reflux and no JVD present. No tracheal deviation present. No thyromegaly present. Cardiovascular: Normal rate, regular rhythm, S1 normal, S2 normal, normal heart sounds, intact distal pulses and normal pulses. Exam reveals no gallop, no S3 and no S4. No murmur heard. Pulses:       Radial pulses are 2+ on the right side, and 2+ on the left side. Dorsalis pedis pulses are 2+ on the right side, and 2+ on the left side. Pulmonary/Chest: Effort normal and breath sounds normal. No respiratory distress. She has no decreased breath sounds. She has no wheezes. She has no rhonchi. She has no rales. Abdominal: Soft. Normal appearance and bowel sounds are normal. She exhibits no distension and no mass. There is no hepatosplenomegaly. There is no tenderness. There is no rigidity, no rebound, no guarding, no CVA tenderness, no tenderness at McBurney's point and negative Hensley's sign. Musculoskeletal:        Left shoulder: She exhibits decreased range of motion and tenderness. She exhibits no bony tenderness, no swelling, no effusion, no crepitus, no deformity, no laceration, no pain, no spasm, normal pulse and normal strength. Lymphadenopathy:        Head (right side): No submental, no submandibular, no preauricular and no occipital adenopathy present. Head (left side): No submental, no submandibular, no preauricular and no occipital adenopathy present. She has no cervical adenopathy. Right: No supraclavicular adenopathy present. Left: No supraclavicular adenopathy present. Neurological: She is alert. She has normal strength and normal reflexes. She is not disoriented. No cranial nerve deficit or sensory deficit. Coordination and gait normal. GCS eye subscore is 4. GCS verbal subscore is 5. GCS motor subscore is 6. Grossly intact    Skin: Skin is warm, dry and intact. No rash noted. She is not diaphoretic. Psychiatric: She has a normal mood and affect. Her speech is normal and behavior is normal. Judgment and thought content normal. Cognition and memory are normal.   Nursing note and vitals reviewed. MDM  Number of Diagnoses or Management Options  Rheumatoid arthritis involving left shoulder, unspecified rheumatoid factor presence [facility]):     ED Course       Procedures      Vitals:  Patient Vitals for the past 12 hrs:   Temp Pulse Resp BP SpO2   08/06/17 1504 98.6 °F (37 °C) 71 16 125/74 93 %   08/06/17 1442 - 70 18 - 98 %     Pulse ox reviewed    Labs, Radiology, EKG:  Labs Reviewed - No data to display      EKG showed NSR with rate of 72 bpm. With no ST elevations or depression and non specific T wave changes. 3:07 PM 8/6/2017    Progress notes, Consult notes or additional Procedure notes:    Patient is feeling well. Patient will be prescribed Tylenol-codeine #3. Denies any SOB. I feel patient safe to be discharged at this time. Patient was discharged in stable condition. Patient is to return to emergency department if any new or worsening condition. Disposition:  Diagnosis:   1. Rheumatoid arthritis involving left shoulder, unspecified rheumatoid factor presence ([facility] [address]        Disposition: Discharge    SCRIBE ATTESTATION STATEMENT  Documented by: Aleyda Mendieta for, and in the presence of,Jalen Bear DO   3:06 PM     Signed by: Eileen Bailey, 08/06/17 3:06 PM    PROVIDER ATTESTATION STATEMENT  I personally performed the services described in the documentation, reviewed the documentation, as recorded by the scribe in my presence, and it accurately and completely records my words and actions.   [address] DO

## 2017-08-06 NOTE — ED TRIAGE NOTES
Patient arrived via self due to left shoulder pain and SOB. Patient states shortness of breath started 2 days ago and her shoulder pain started yesterday. Denies injury or trauma. Hx of rheumatoid arthritis and Lupus.

## 2017-08-08 LAB
ATRIAL RATE: 72 BPM
CALCULATED P AXIS, ECG09: 64 DEGREES
CALCULATED R AXIS, ECG10: 21 DEGREES
CALCULATED T AXIS, ECG11: 31 DEGREES
DIAGNOSIS, 93000: NORMAL
P-R INTERVAL, ECG05: 138 MS
Q-T INTERVAL, ECG07: 380 MS
QRS DURATION, ECG06: 78 MS
QTC CALCULATION (BEZET), ECG08: 416 MS
VENTRICULAR RATE, ECG03: 72 BPM

## 2017-09-15 ENCOUNTER — OFFICE VISIT (OUTPATIENT)
Dept: ORTHOPEDIC SURGERY | Age: 47
End: 2017-09-15

## 2017-09-15 VITALS
HEART RATE: 77 BPM | DIASTOLIC BLOOD PRESSURE: 79 MMHG | OXYGEN SATURATION: 93 % | WEIGHT: 184 LBS | HEIGHT: 65 IN | TEMPERATURE: 99.3 F | SYSTOLIC BLOOD PRESSURE: 121 MMHG | RESPIRATION RATE: 14 BRPM | BODY MASS INDEX: 30.66 KG/M2

## 2017-09-15 DIAGNOSIS — M17.0 PRIMARY OSTEOARTHRITIS OF BOTH KNEES: Primary | ICD-10-CM

## 2017-09-15 RX ORDER — VALGANCICLOVIR 450 MG/1
900 TABLET, FILM COATED ORAL DAILY
COMMUNITY
End: 2018-07-12

## 2017-09-15 RX ORDER — DAPSONE 100 MG/1
100 TABLET ORAL DAILY
COMMUNITY
End: 2018-07-12

## 2017-09-15 NOTE — PROGRESS NOTES
[address] [place] [address]  [phone]           Patient: Rika Garcia                MRN: [id number]       SSN: [id number]  YOB: 1970        AGE: 56 y.o. SEX: female  Body mass index is 30.62 kg/(m^2). PCP: Claudio Kent MD  09/15/17      This office note has been dictated. REVIEW OF SYSTEMS:  Constitutional: Negative for fever, chills, weight loss and malaise/fatigue. HENT: Negative. Eyes: Negative. Respiratory: Negative. Cardiovascular: Negative. Gastrointestinal: No bowel incontinence or constipation. Genitourinary: No bladder incontinence or saddle anesthesia. Skin: Negative. Neurological: Negative. Endo/Heme/Allergies: Negative. Psychiatric/Behavioral: Negative. Musculoskeletal: As per HPI above. Past Medical History:   Diagnosis Date    Anxiety     Bed wetting     Chronic kidney disease     Depression     [facility]     Glomerulonephritis     Hematuria     Hyperlipidemia     Hyperparathyroidism due to renal insufficiency ([facility])     Hypertension     Long term (current) use of systemic steroids     Neuropathy associated with MGUS ([facility])     OAB (overactive bladder)     Osteoporosis     RA (rheumatoid [facility]) ([facility] [address]     Renal transplant recipient 09/30/2008    Nephrologist: Dr. Murali Guzman [facility]     SLE (systemic lupus erythematosus) ([facility] 1995    Synovitis of knee     Tendonitis of knee, left     Tendonitis of knee, right     UTI (urinary tract infection)          Current Outpatient Prescriptions:     dapsone 100 mg tablet, Take 100 mg by mouth daily. , Disp: , Rfl:     valGANciclovir (VALCYTE) 450 mg tablet, Take 900 mg by mouth daily. , Disp: , Rfl:     acetaminophen-codeine (TYLENOL-CODEINE #3) 300-30 mg per tablet, Take 1 Tab by mouth every four (4) hours as needed for Pain. Max Daily Amount: 6 Tabs. , Disp: 12 Tab, Rfl: 0    aspirin 81 mg chewable tablet, Take 81 mg by mouth daily. , Disp: , Rfl:     ergocalciferol (ERGOCALCIFEROL) 50,000 unit capsule, Take 50,000 Units by mouth two (2) days a week. M&F, Disp: , Rfl:     atenolol (TENORMIN) 50 mg tablet, Take 50 mg by mouth daily. , Disp: , Rfl:     azaTHIOprine (IMURAN) 50 mg tablet, Take 50 mg by mouth daily. , Disp: , Rfl:     predniSONE (DELTASONE) 5 mg tablet, Take 5 mg by mouth daily. , Disp: , Rfl:     tacrolimus (PROGRAF) 1 mg capsule, Take 2-3 mg by mouth every twelve (12) hours. 3 mg QAM, 2 mg QHS, Disp: , Rfl:     predniSONE (DELTASONE) 20 mg tablet, Begin taking tomorrow Wednesday 3/22/17 as you received your initial dose while in the ER. 3 tabs po every day x 2 days, then 2 tabs po every day x 3 days, then 1 tab po every day x 3 days. , Disp: 15 Tab, Rfl: 0    sertraline (ZOLOFT) 100 mg tablet, Take 1 Tab by mouth daily. 1/2/17, QTY#90 HARLEEN GUZMÁN, Disp: , Rfl: 0    albuterol (PROVENTIL HFA, VENTOLIN HFA, PROAIR HFA) 90 mcg/actuation inhaler, Take 2 Puffs by inhalation every four (4) hours as needed for Wheezing or Shortness of Breath (Cough). , Disp: 1 Inhaler, Rfl: 0    inhalational spacing device, ALWAYS USE WITH INHALER, Disp: 1 Device, Rfl: 0    Allergies   Allergen Reactions    Contrast Dye [Iodine] Other (comments)    Iodinated Contrast- Oral And Iv Dye Unknown (comments)    Mycophenolate Mofetil Other (comments)     GI distress-on imuran now    Sulfa (Sulfonamide Antibiotics) Itching and Hives       Social History     Social History    Marital status: LEGALLY      Spouse name: N/A    Number of children: N/A    Years of education: N/A     Occupational History    Not on file.      Social History Main Topics    Smoking status: Never Smoker    Smokeless tobacco: Never Used    Alcohol use No    Drug use: No    Sexual activity: No     Other Topics Concern    Not on file     Social History Narrative       Past Surgical History:   Procedure Laterality Date    HX OTHER SURGICAL      Hemodialysis    HX RENAL TRANSPLANT  09/30/2008              We did see Ms. Cristobal for face to face exam for a power wheelchair. In regards to her bilateral knee advanced osteoarthritis, her pain continues. The patient fortunately responded pretty well to a series of viscosupplementation. Her knees are feeling a little bit better. She does have a long-standing history of lupus and rheumatoid arthritis affecting multiple joints, as well as her kidneys. She did have a kidney transplant. She did well with it. She does have difficulty in extension periods of walking greater than 10 minutes. She has troubles with stairs, getting up from a chair, going up and down. She does have occasional night pain. She has had no recent fevers, chills, systemic changes, and no injuries to report. PHYSICAL EXAMINATION:  In general, the patient is alert and oriented x 3 in no acute distress. The patient is well-developed, well-nourished, with a normal affect. The patient is afebrile. HEENT:  Head is normocephalic and atraumatic. Pupils are equally round and reactive to light and accommodation. Extraocular eye movements are intact. Neck is supple. Trachea is midline. No JVD is present. Breathing is nonlabored. Examination of the lower extremities does reveal slightly decreased range of motion of each of the hips. She does have discomfort to palpation of the trochanteric bursae bilaterally. Examination of each of the knees reveals the skin is intact. There is no ecchymosis, no warmth, and no signs of infection or cellulitis present. There is pain with palpation tricompartmentally and crepitus arising from the anterior compartment. Findings are consistent with advancing arthritis of each of the knees. Range of motion is pretty well preserved. She has negative calf tenderness, negative Winifreds sign, and no signs for DVT are present. RADIOGRAPHS:  Review of previous radiographs does confirm advancing arthritis of each of the knees. ASSESSMENT:      1. Bilateral trochanteric bursitis. 2. Systemic lupus erythematosus. 3. Bilateral knee advanced osteoarthritis. PLAN:  At this point, the patient is not a good surgical candidate with a history of kidney transplant. She will continue on conservative treatment. I think she would benefit from Euflexxa viscosupplementation every six months, as long as it works well for her, as well as a power wheelchair to assist her in activities of daily living of extended activity. The patient was previously referred to an occupational therapist and I agree with the findings. We will plan on seeing her back in the office as-needed.                 JR Jaret ERAZO, PA-C, ATC

## 2018-06-13 NOTE — ED NOTES
Bedside shift change report given to Aida Melendez RN (oncoming nurse) by Lore Hua RN (offgoing nurse). Report included the following information SBAR, ED Summary and Procedure Summary.
Pt ambulated to the restroom independently without difficulty at this time.
Pt arrived to the ED with co productive cough and chest congestion x 2 weeks. Pt states her BP was 168/98 earlier today.  Hx depression anxiety and Lupus
113

## 2018-07-03 ENCOUNTER — OFFICE VISIT (OUTPATIENT)
Dept: ORTHOPEDIC SURGERY | Facility: CLINIC | Age: 48
End: 2018-07-03

## 2018-07-03 VITALS
SYSTOLIC BLOOD PRESSURE: 141 MMHG | OXYGEN SATURATION: 98 % | HEIGHT: 65 IN | RESPIRATION RATE: 18 BRPM | HEART RATE: 82 BPM | DIASTOLIC BLOOD PRESSURE: 83 MMHG | TEMPERATURE: 98.8 F | BODY MASS INDEX: 33.62 KG/M2 | WEIGHT: 201.8 LBS

## 2018-07-03 DIAGNOSIS — M25.562 CHRONIC PAIN OF LEFT KNEE: ICD-10-CM

## 2018-07-03 DIAGNOSIS — M17.12 PRIMARY OSTEOARTHRITIS OF LEFT KNEE: Primary | ICD-10-CM

## 2018-07-03 DIAGNOSIS — G89.29 CHRONIC PAIN OF LEFT KNEE: ICD-10-CM

## 2018-07-03 DIAGNOSIS — M81.0 OSTEOPOROSIS, UNSPECIFIED OSTEOPOROSIS TYPE, UNSPECIFIED PATHOLOGICAL FRACTURE PRESENCE: ICD-10-CM

## 2018-07-03 RX ORDER — TRAMADOL HYDROCHLORIDE 50 MG/1
50 TABLET ORAL
Qty: 21 TAB | Refills: 0 | Status: CANCELLED | OUTPATIENT
Start: 2018-07-03

## 2018-07-03 RX ORDER — BETAMETHASONE SODIUM PHOSPHATE AND BETAMETHASONE ACETATE 3; 3 MG/ML; MG/ML
6 INJECTION, SUSPENSION INTRA-ARTICULAR; INTRALESIONAL; INTRAMUSCULAR; SOFT TISSUE ONCE
Qty: 1 ML | Refills: 0
Start: 2018-07-03 | End: 2018-07-03

## 2018-07-03 RX ORDER — ACETAMINOPHEN AND CODEINE PHOSPHATE 300; 30 MG/1; MG/1
1 TABLET ORAL EVERY 8 HOURS
Qty: 21 TAB | Refills: 0 | Status: SHIPPED | OUTPATIENT
Start: 2018-07-03

## 2018-07-03 NOTE — PROGRESS NOTES
Patient: Marry Vance                MRN: [id number]       SSN: [id number]  YOB: 1970        AGE: 53 y.o. SEX: female  Body mass index is 33.58 kg/(m^2). PCP: Deep Keita MD  07/03/18    HISTORY: I had the pleasure of reviewing Kenia. Kenai has a three week history of increased left knee swelling and an inability to straighten the knee. She has had no particular trauma. She has known lupus and significant arthritis of the knees, as well as being status post kidney transplant. She denies fevers, chills, night sweats, or weight loss. She is otherwise feeling well. Her appetite is okay. She denies, again, any trauma. There is not too much in the way of groin pain. It is mainly left knee. PHYSICAL EXAMINATION:  On examination today, she has mild to moderate swelling but not tense, and when I first approached the patient, she could not extend the knee all the way. With a valgus maneuver, gently, we are able to unlock the knee and get the leg out to full extension. We can also flex the knee as well. The hips rotate adequately. I cannot feel the dorsalis pedis pulse. There is a little bit of swelling distally. The skin is intact. The calf is nontender. The knee is mildly swollen, but there is no evidence for infection, and she bends, actually quite well, to about 115°. RADIOGRAPHS:  I did review her x-rays. They are difficult to interpret due to renal osteodystrophy. I do not see an obvious fracture. She has significant osteoporosis with a T score in the -3 range as well seen on a previous bone scan. PROCEDURE:  Under aseptic conditions and after informed, written consent with a time out, the left knee was injected with 1 cc of the Celestone preparation, i.e. 6 mg, which was well tolerated. PLAN:  I am going to obtain an MRI to rule out a stress fracture, as the x-rays are difficult to interpret. I am suspicious she has a medial meniscus tear. We would like to avoid surgery as well. I will see her back after the MRI evaluation. The risks and benefits were described with regards to the injection, and she elects to proceed. REVIEW OF SYSTEMS:      CON: negative for weight loss, fever  EYE: negative for double vision  ENT: negative for hoarseness  RS:   negative for Tb  GI:    negative for blood in stool  :  negative for blood in urine  Other systems reviewed and noted below. Past Medical History:   Diagnosis Date    Anxiety     Bed wetting     Chronic kidney disease     Depression     [facility]     Glomerulonephritis     Hematuria     Hyperlipidemia     Hyperparathyroidism due to renal insufficiency (HCC)     Hypertension     Long term (current) use of systemic steroids     Neuropathy associated with MGUS ([facility])     OAB (overactive bladder)     Osteoporosis     RA (rheumatoid [facility]) [facility]     Renal transplant recipient 09/30/2008    Nephrologist: Dr. Kirk Acosta     [facility]     SLE (systemic lupus erythematosus) ([facility] 1995    Synovitis of knee     Tendonitis of knee, left     Tendonitis of knee, right     UTI (urinary tract infection)        Family History   Problem Relation Age of Onset    Hypertension Father     Cancer Father     Hypertension Mother     Diabetes Mother     Stroke Mother     Psychiatric Disorder Mother     Psychiatric Disorder Sister        Current Outpatient Prescriptions   Medication Sig Dispense Refill    aspirin 81 mg chewable tablet Take 81 mg by mouth daily.  ergocalciferol (ERGOCALCIFEROL) 50,000 unit capsule Take 50,000 Units by mouth two (2) days a week. M&F      atenolol (TENORMIN) 50 mg tablet Take 50 mg by mouth daily.  azaTHIOprine (IMURAN) 50 mg tablet Take 50 mg by mouth daily.  predniSONE (DELTASONE) 5 mg tablet Take 5 mg by mouth daily.       tacrolimus (PROGRAF) 1 mg capsule Take 2-3 mg by mouth every twelve (12) hours. 3 mg QAM, 2 mg QHS      sertraline (ZOLOFT) 100 mg tablet Take 1 Tab by mouth daily. 1/2/17, QTY#90 ARIELLEHARLEEN  0    dapsone 100 mg tablet Take 100 mg by mouth daily.  valGANciclovir (VALCYTE) 450 mg tablet Take 900 mg by mouth daily.  acetaminophen-codeine (TYLENOL-CODEINE #3) 300-30 mg per tablet Take 1 Tab by mouth every four (4) hours as needed for Pain. Max Daily Amount: 6 Tabs. 12 Tab 0    predniSONE (DELTASONE) 20 mg tablet Begin taking tomorrow Wednesday 3/22/17 as you received your initial dose while in the ER. 3 tabs po every day x 2 days, then 2 tabs po every day x 3 days, then 1 tab po every day x 3 days. (Patient taking differently: 5 mg. Begin taking tomorrow Wednesday 3/22/17 as you received your initial dose while in the ER. 3 tabs po every day x 2 days, then 2 tabs po every day x 3 days, then 1 tab po every day x 3 days. ) 15 Tab 0    albuterol (PROVENTIL HFA, VENTOLIN HFA, PROAIR HFA) 90 mcg/actuation inhaler Take 2 Puffs by inhalation every four (4) hours as needed for Wheezing or Shortness of Breath (Cough). 1 Inhaler 0    inhalational spacing device ALWAYS USE WITH INHALER 1 Device 0       Allergies   Allergen Reactions    Contrast Dye [Iodine] Other (comments)    Iodinated Contrast- Oral And Iv Dye Unknown (comments)    Mycophenolate Mofetil Other (comments)     GI distress-on imuran now    Sulfa (Sulfonamide Antibiotics) Itching and Hives       Past Surgical History:   Procedure Laterality Date    HX OTHER SURGICAL      Hemodialysis    HX RENAL TRANSPLANT  09/30/2008       Social History     Social History    Marital status: LEGALLY      Spouse name: N/A    Number of children: N/A    Years of education: N/A     Occupational History    Not on file.      Social History Main Topics    Smoking status: Never Smoker    Smokeless tobacco: Never Used    Alcohol use No    Drug use: No    Sexual activity: No     Other Topics Concern    Not on file     Social History Narrative       Visit Vitals    /83    Pulse 82    Temp 98.8 °F (37.1 °C) (Oral)    Resp 18    Ht 5' 5\" (1.651 m)    Wt 201 lb 12.8 oz (91.5 kg)    SpO2 98%    BMI 33.58 kg/m2         PHYSICAL EXAMINATION:  GENERAL: Alert and oriented x3, in no acute distress, well-developed, well-nourished, afebrile. HEART: No JVD. EYES: No scleral icterus   NECK: No significant lymphadenopathy   LUNGS: No respiratory compromise or indrawing  ABDOMEN: Soft, non-tender, non-distended. Electronically signed by:  Gamaliel Cota MD

## 2018-07-05 ENCOUNTER — HOSPITAL ENCOUNTER (OUTPATIENT)
Dept: MRI IMAGING | Age: 48
Discharge: HOME | End: 2018-07-05
Attending: ORTHOPAEDIC SURGERY
Payer: MEDICARE

## 2018-07-05 DIAGNOSIS — G89.29 CHRONIC PAIN OF LEFT KNEE: ICD-10-CM

## 2018-07-05 DIAGNOSIS — M17.12 PRIMARY OSTEOARTHRITIS OF LEFT KNEE: ICD-10-CM

## 2018-07-05 DIAGNOSIS — M25.562 CHRONIC PAIN OF LEFT KNEE: ICD-10-CM

## 2018-07-05 PROCEDURE — 73721 MRI JNT OF LWR EXTRE W/O DYE: CPT

## 2018-07-12 ENCOUNTER — OFFICE VISIT (OUTPATIENT)
Dept: ORTHOPEDIC SURGERY | Age: 48
End: 2018-07-12

## 2018-07-12 VITALS
WEIGHT: 201 LBS | RESPIRATION RATE: 16 BRPM | DIASTOLIC BLOOD PRESSURE: 92 MMHG | OXYGEN SATURATION: 93 % | SYSTOLIC BLOOD PRESSURE: 135 MMHG | TEMPERATURE: 98.2 F | HEART RATE: 78 BPM | HEIGHT: 65 IN | BODY MASS INDEX: 33.49 KG/M2

## 2018-07-12 DIAGNOSIS — M17.12 PRIMARY OSTEOARTHRITIS OF LEFT KNEE: ICD-10-CM

## 2018-07-12 DIAGNOSIS — M79.641 RIGHT HAND PAIN: Primary | ICD-10-CM

## 2018-07-12 DIAGNOSIS — S83.412D SPRAIN OF MEDIAL COLLATERAL LIGAMENT OF LEFT KNEE, SUBSEQUENT ENCOUNTER: ICD-10-CM

## 2018-07-12 DIAGNOSIS — M76.899 QUADRICEPS TENDINITIS: ICD-10-CM

## 2018-07-12 NOTE — PROGRESS NOTES
[address] [place] [address]  [phone]           Patient: Evert Garcia                MRN: [id number]       SSN: [id number]  YOB: 1970        AGE: 53 y.o. SEX: female  Body mass index is 33.45 kg/(m^2). PCP: None  07/12/18      This office note has been dictated. REVIEW OF SYSTEMS:  Constitutional: Negative for fever, chills, weight loss and malaise/fatigue. HENT: Negative. Eyes: Negative. Respiratory: Negative. Cardiovascular: Negative. Gastrointestinal: No bowel incontinence or constipation. Genitourinary: No bladder incontinence or saddle anesthesia. Skin: Negative. Neurological: Negative. Endo/Heme/Allergies: Negative. Psychiatric/Behavioral: Negative. Musculoskeletal: As per HPI above. Past Medical History:   Diagnosis Date    Anxiety     Bed wetting     Chronic kidney disease     Depression     [facility]     Glomerulonephritis     Hematuria     Hyperlipidemia     Hyperparathyroidism due to renal insufficiency ([facility])     Hypertension     Long term (current) use of systemic steroids     Neuropathy associated with MGUS ([facility])     OAB (overactive bladder)     Osteoporosis     [facility]oid [facility]) [facility]     Renal transplant recipient 09/30/2008    Nephrologist: Dr. Gorge Arreola Seizures ([facility]     SLE (systemic lupus erythematosus) ([facility] 1995    Synovitis of knee     Tendonitis of knee, left     Tendonitis of knee, right     UTI (urinary tract infection)          Current Outpatient Prescriptions:     acetaminophen-codeine (TYLENOL-CODEINE #3) 300-30 mg per tablet, Take 1 Tab by mouth every eight (8) hours. Max Daily Amount: 3 Tabs. Indications: Pain, Disp: 21 Tab, Rfl: 0    aspirin 81 mg chewable tablet, Take 81 mg by mouth daily. , Disp: , Rfl:     ergocalciferol (ERGOCALCIFEROL) 50,000 unit capsule, Take 50,000 Units by mouth two (2) days a week. M&F, Disp: , Rfl:     atenolol (TENORMIN) 50 mg tablet, Take 50 mg by mouth daily. , Disp: , Rfl:     azaTHIOprine (IMURAN) 50 mg tablet, Take 50 mg by mouth daily. , Disp: , Rfl:     predniSONE (DELTASONE) 5 mg tablet, Take 5 mg by mouth daily. , Disp: , Rfl:     tacrolimus (PROGRAF) 1 mg capsule, Take 2-3 mg by mouth every twelve (12) hours. 3 mg QAM, 2 mg QHS, Disp: , Rfl:     sertraline (ZOLOFT) 100 mg tablet, Take 1 Tab by mouth daily. 1/2/17, QTY#90 HARLEEN GUZMÁN, Disp: , Rfl: 0    Allergies   Allergen Reactions    Contrast Dye [Iodine] Other (comments)    Iodinated Contrast- Oral And Iv Dye Unknown (comments)    Mycophenolate Mofetil Other (comments)     GI distress-on imuran now    Sulfa (Sulfonamide Antibiotics) Itching and Hives       Social History     Social History    Marital status: LEGALLY      Spouse name: N/A    Number of children: N/A    Years of education: N/A     Occupational History    Not on file. Social History Main Topics    Smoking status: Never Smoker    Smokeless tobacco: Never Used    Alcohol use No    Drug use: No    Sexual activity: No     Other Topics Concern    Not on file     Social History Narrative       Past Surgical History:   Procedure Laterality Date    HX OTHER SURGICAL      Hemodialysis    HX RENAL TRANSPLANT  09/30/2008           SUBJECTIVE:  The patient is seen today for evaluation of her left knee. She was sent for an MRI to rule out any occult fractures or stress fractures. She states the injection of her knee helped considerably, and she is not really having any pain in her knee at this point. She was given a brace. However, she is not wearing it. The last three days, she has had a little right hand swelling without injury. She has had no fevers or chills. She has had no night sweats. She does remind me she does have lupus. She has had a previous kidney transplant.      PHYSICAL EXAMINATION:  In general, the patient is alert and oriented x 3 in no acute distress. The patient is well-developed, well-nourished, with a normal affect. The patient is afebrile. HEENT:  Head is normocephalic and atraumatic. Pupils are equally round and reactive to light and accommodation. Extraocular eye movements are intact. Neck is supple. Trachea is midline. No JVD is present. Breathing is nonlabored. Examination of the right hand reveals the skin to be intact. She does have some swelling to the hand itself around the third, fourth, and fifth metacarpal heads. She has some discomfort to palpation. There is no pain with passive range of motion. There is no erythema and no signs for infection or cellulitis present. Radial pulse is 2+. Capillary refill is within normal limits. Examination of the lower extremities reveals pain-free range of motion of the hips. There is no pain to palpation of the greater trochanteric bursae. There is negative straight leg raise. There is negative calf tenderness. There is negative Winifreds. There is no evidence of DVT present. Examination of the left knee reveals the skin is intact. There is no ecchymosis and no warmth. I was able to fully extend her leg without discomfort without defect noted to the extensor mechanism. There is no pain to palpation to the quadriceps tendon or patellar ligament. She does have a little bit of laxity medially. There is no pain medially to palpate to the joint line. She does have a little discomfort to the MCL itself. There is good endpoint noted to the MCL, however. RADIOGRAPHS:  Radiographs in the office today, including three views of the right hand, show no acute bony abnormalities. Review of the MRI of the left knee shows inflammation, partial thickness, to the MCL, a small joint effusion, and inflammation, partial thickness at the quadriceps tendon and patellar tendon. ASSESSMENT:      1. Left knee MCL sprain.    2. Left knee osteoarthritis. 3. Left knee patellar and quadriceps tendinitis. 4. Right hand inflammation, lupus flare. PLAN:  At this point, we are going to get her an ulnar gutter volar splint for the right hand. She can come out of it as needed. She will continue with her Prednisone. With regards to her knee, I have asked her to wear her brace. She states it does not fit well. I have asked her to bring back the other one, so we can get her into a new one. I would like to have her wear this at all times. We will plan on seeing her back in about two weeks time for evaluation. She will call with any questions or concerns that shall arise.                     JR Jaret ERAZO, PA-C, ATC

## 2018-07-26 ENCOUNTER — OFFICE VISIT (OUTPATIENT)
Dept: ORTHOPEDIC SURGERY | Facility: CLINIC | Age: 48
End: 2018-07-26

## 2018-07-26 VITALS
WEIGHT: 200.4 LBS | RESPIRATION RATE: 16 BRPM | DIASTOLIC BLOOD PRESSURE: 73 MMHG | BODY MASS INDEX: 33.39 KG/M2 | OXYGEN SATURATION: 97 % | HEIGHT: 65 IN | HEART RATE: 78 BPM | SYSTOLIC BLOOD PRESSURE: 122 MMHG | TEMPERATURE: 97.1 F

## 2018-07-26 DIAGNOSIS — M76.892 LEFT KNEE TENDONITIS: Primary | ICD-10-CM

## 2018-07-26 RX ORDER — DICLOFENAC SODIUM 10 MG/G
4 GEL TOPICAL 4 TIMES DAILY
Qty: 5 EACH | Refills: 0 | Status: SHIPPED | OUTPATIENT
Start: 2018-07-26 | End: 2022-03-02

## 2018-07-26 NOTE — PROGRESS NOTES
[address] [place] [address]  [phone]           Patient: Elzbieta Salcido                MRN: [id number]       SSN: [id number]  YOB: 1970        AGE: 53 y.o. SEX: female  Body mass index is 33.35 kg/(m^2). PCP: None  07/26/18      This office note has been dictated. REVIEW OF SYSTEMS:  Constitutional: Negative for fever, chills, weight loss and malaise/fatigue. HENT: Negative. Eyes: Negative. Respiratory: Negative. Cardiovascular: Negative. Gastrointestinal: No bowel incontinence or constipation. Genitourinary: No bladder incontinence or saddle anesthesia. Skin: Negative. Neurological: Negative. Endo/Heme/Allergies: Negative. Psychiatric/Behavioral: Negative. Musculoskeletal: As per HPI above. Past Medical History:   Diagnosis Date    Anxiety     Bed wetting     Chronic kidney disease     Depression     [facility]     Glomerulonephritis     Hematuria     Hyperlipidemia     Hyperparathyroidism due to renal insufficiency ([facility])     Hypertension     Long term (current) use of systemic steroids     Neuropathy associated with MGUS ([facility])     OAB (overactive bladder)     Osteoporosis     RA (rheumatoid arthritis) ([facility] [facility]     Renal transplant recipient 09/30/2008    Nephrologist: Dr. Jelly Marroquin Seizures ([facility] [address]     SLE (systemic lupus erythematosus) ([facility] [address] 1995    Synovitis of knee     Tendonitis of knee, left     Tendonitis of knee, right     UTI (urinary tract infection)          Current Outpatient Prescriptions:     acetaminophen-codeine (TYLENOL-CODEINE #3) 300-30 mg per tablet, Take 1 Tab by mouth every eight (8) hours. Max Daily Amount: 3 Tabs. Indications: Pain, Disp: 21 Tab, Rfl: 0    aspirin 81 mg chewable tablet, Take 81 mg by mouth daily. , Disp: , Rfl:     ergocalciferol (ERGOCALCIFEROL) 50,000 unit capsule, Take 50,000 Units by mouth two (2) days a week. M&F, Disp: , Rfl:     atenolol (TENORMIN) 50 mg tablet, Take 50 mg by mouth daily. , Disp: , Rfl:     azaTHIOprine (IMURAN) 50 mg tablet, Take 50 mg by mouth daily. , Disp: , Rfl:     predniSONE (DELTASONE) 5 mg tablet, Take 5 mg by mouth daily. , Disp: , Rfl:     tacrolimus (PROGRAF) 1 mg capsule, Take 2-3 mg by mouth every twelve (12) hours. 3 mg QAM, 2 mg QHS, Disp: , Rfl:     sertraline (ZOLOFT) 100 mg tablet, Take 1 Tab by mouth daily. 1/2/17, QTY#90 HARLEEN GUZMÁN, Disp: , Rfl: 0    Allergies   Allergen Reactions    Contrast Dye [Iodine] Other (comments)    Iodinated Contrast- Oral And Iv Dye Unknown (comments)    Mycophenolate Mofetil Other (comments)     GI distress-on imuran now    Sulfa (Sulfonamide Antibiotics) Itching and Hives       Social History     Social History    Marital status: LEGALLY      Spouse name: N/A    Number of children: N/A    Years of education: N/A     Occupational History    Not on file. Social History Main Topics    Smoking status: Never Smoker    Smokeless tobacco: Never Used    Alcohol use No    Drug use: No    Sexual activity: No     Other Topics Concern    Not on file     Social History Narrative       Past Surgical History:   Procedure Laterality Date    HX OTHER SURGICAL      Hemodialysis    HX RENAL TRANSPLANT  09/30/2008               SUBJECTIVE:   We did see the patient today for evaluation of her left knee and right hand. The patient had a lupus flare of her right hand which has settled down. She had been coming out of the ** moving her wrist and hand without discomfort. With regard to the left knee, she had a cortisone injection which lasted about 2 weeks. It has worn off now. She still has some discomfort in the knee.   She did have a previous MRI of the left knee which showed a partial-thickness tear, inflammation, of the MCL; small joint effusion, inflammation; partial-thickness tear, quadriceps tendon, patellar tendon; most severe inflammation, patellar retinaculum; no patellar subluxation noted. PHYSICAL EXAMINATION: In general, the patient is alert and oriented x3 and is in no acute distress. The patient is well-developed and well-nourished. The patient is afebrile. HEENT:  Head is normocephalic and atraumatic. Extraocular eye movements are intact. No JVD is present. Breathing is nonlabored. Examination of lower extremities reveals pain-free range of motion of the hips. There is no pain to palpation to the trochanteric bursa. Negative straight leg raise. Negative calf tenderness. No Homans. No signs of DVT present. Left knee reveals skin is intact. No redness. No warmth. She has a joint effusion with signs for infection or cellulitis present. She is able to hold a straight leg raise against gravity and resistance without complication. No defects noted in the extensor mechanism itself. She does have discomfort to palpation about the quadriceps tendon. Again, no defects noted. She does have a little laxity medially. Good endpoint noted. Some discomfort to palpation of the medial and lateral joint lines as well as patellofemoral grind. Right hand reveals skin is intact. No ecchymosis. No warmth. No signs for infection or cellulitis present. Decreased information noted without acute abnormalities. Radial pulses are 2+ and capillary refill is normal.    ASSESSMENT:    1. Left knee pain with quadriceps and patellar tendinitis. 2. Medial collateral ligament (MCL) sprain. 3. Right hand rheumatoid flare, improved. PLAN:  At this point, she is getting a new brace today. We are going to order an ultrasound of the quadriceps tendon to rule out any other abnormalities. I think she just has some tendinitis which was confirmed by the MRI. However, there is a question of partial tear.   We are going to get her into physical therapy for generalized strengthening and conditioning to the left knee as well as ultrasound E-Stim for the quadriceps and patellar tendinitis. We will order some Voltaren Gel to apply to the knee about 4 times a day.             JR Jaret ERAZO, ADA, ATC

## 2021-08-03 PROBLEM — E78.5 HYPERLIPIDEMIA: Status: RESOLVED | Noted: 2021-08-03 | Resolved: 2021-08-03

## 2022-01-26 ENCOUNTER — HOSPITAL ENCOUNTER (EMERGENCY)
Age: 52
Discharge: LEFT BEFORE DISCHARGE | End: 2022-01-26
Attending: EMERGENCY MEDICINE

## 2022-12-08 ENCOUNTER — DOCUMENTATION ONLY (OUTPATIENT)
Dept: ORTHOPEDIC SURGERY | Age: 52
End: 2022-12-08

## 2022-12-08 NOTE — PROGRESS NOTES
Form received from [facility] for Mobility Evaluation Referral via Fax at our [address] [place] [address] location. Blank form scanned into patient's chart. Form placed in forms bin at [address] [place] [address] location for completion.

## 2022-12-15 ENCOUNTER — DOCUMENTATION ONLY (OUTPATIENT)
Dept: ORTHOPEDIC SURGERY | Age: 52
End: 2022-12-15

## 2022-12-17 ENCOUNTER — HOSPITAL ENCOUNTER (EMERGENCY)
Age: 52
Discharge: HOME | End: 2022-12-18
Attending: EMERGENCY MEDICINE
Payer: MEDICARE

## 2022-12-17 VITALS
OXYGEN SATURATION: 99 % | HEIGHT: 65 IN | TEMPERATURE: 98.3 F | WEIGHT: 175 LBS | RESPIRATION RATE: 18 BRPM | BODY MASS INDEX: 29.16 KG/M2 | SYSTOLIC BLOOD PRESSURE: 161 MMHG | DIASTOLIC BLOOD PRESSURE: 88 MMHG | HEART RATE: 75 BPM

## 2022-12-17 DIAGNOSIS — F43.21 GRIEF REACTION: Primary | ICD-10-CM

## 2022-12-17 DIAGNOSIS — D64.9 ANEMIA, UNSPECIFIED TYPE: ICD-10-CM

## 2022-12-17 DIAGNOSIS — N18.9 CHRONIC RENAL IMPAIRMENT, UNSPECIFIED CKD STAGE: ICD-10-CM

## 2022-12-17 LAB
ALBUMIN SERPL-MCNC: 3.3 G/DL (ref 3.4–5)
ALBUMIN/GLOB SERPL: 0.8 {RATIO} (ref 0.8–1.7)
ALP SERPL-CCNC: 88 U/L (ref 45–117)
ALT SERPL-CCNC: 12 U/L (ref 13–56)
AMPHET UR QL SCN: NEGATIVE
ANION GAP SERPL CALC-SCNC: 7 MMOL/L (ref 3–18)
APPEARANCE UR: CLEAR
AST SERPL-CCNC: 7 U/L (ref 10–38)
BACTERIA URNS QL MICRO: ABNORMAL /HPF
BARBITURATES UR QL SCN: NEGATIVE
BASOPHILS # BLD: 0.1 K/UL (ref 0–0.1)
BASOPHILS NFR BLD: 1 % (ref 0–2)
BENZODIAZ UR QL: NEGATIVE
BILIRUB SERPL-MCNC: 0.7 MG/DL (ref 0.2–1)
BILIRUB UR QL: NEGATIVE
BUN SERPL-MCNC: 36 MG/DL (ref 7–18)
BUN/CREAT SERPL: 20 (ref 12–20)
CALCIUM SERPL-MCNC: 9.4 MG/DL (ref 8.5–10.1)
CANNABINOIDS UR QL SCN: NEGATIVE
CHLORIDE SERPL-SCNC: 111 MMOL/L (ref 100–111)
CO2 SERPL-SCNC: 23 MMOL/L (ref 21–32)
COCAINE UR QL SCN: NEGATIVE
COLOR UR: YELLOW
CREAT SERPL-MCNC: 1.77 MG/DL (ref 0.6–1.3)
DIFFERENTIAL METHOD BLD: ABNORMAL
EOSINOPHIL # BLD: 0.2 K/UL (ref 0–0.4)
EOSINOPHIL NFR BLD: 2 % (ref 0–5)
EPITH CASTS URNS QL MICRO: ABNORMAL /LPF (ref 0–5)
ERYTHROCYTE [DISTWIDTH] IN BLOOD BY AUTOMATED COUNT: 13.5 % (ref 11.6–14.5)
ETHANOL SERPL-MCNC: <3 MG/DL (ref 0–3)
FLUAV RNA SPEC QL NAA+PROBE: NOT DETECTED
FLUBV RNA SPEC QL NAA+PROBE: NOT DETECTED
GLOBULIN SER CALC-MCNC: 4.2 G/DL (ref 2–4)
GLUCOSE SERPL-MCNC: 131 MG/DL (ref 74–99)
GLUCOSE UR STRIP.AUTO-MCNC: NEGATIVE MG/DL
HCT VFR BLD AUTO: 32.3 % (ref 35–45)
HDSCOM,HDSCOM: NORMAL
HGB BLD-MCNC: 11.6 G/DL (ref 12–16)
HGB UR QL STRIP: ABNORMAL
IMM GRANULOCYTES # BLD AUTO: 0.2 K/UL (ref 0–0.04)
IMM GRANULOCYTES NFR BLD AUTO: 2 % (ref 0–0.5)
KETONES UR QL STRIP.AUTO: NEGATIVE MG/DL
LEUKOCYTE ESTERASE UR QL STRIP.AUTO: ABNORMAL
LYMPHOCYTES # BLD: 1.3 K/UL (ref 0.9–3.6)
LYMPHOCYTES NFR BLD: 11 % (ref 21–52)
MCH RBC QN AUTO: 28 PG (ref 24–34)
MCHC RBC AUTO-ENTMCNC: 35.9 G/DL (ref 31–37)
MCV RBC AUTO: 77.8 FL (ref 78–100)
METHADONE UR QL: NEGATIVE
MONOCYTES # BLD: 0.9 K/UL (ref 0.05–1.2)
MONOCYTES NFR BLD: 8 % (ref 3–10)
NEUTS SEG # BLD: 8.8 K/UL (ref 1.8–8)
NEUTS SEG NFR BLD: 78 % (ref 40–73)
NITRITE UR QL STRIP.AUTO: NEGATIVE
NRBC # BLD: 0 K/UL (ref 0–0.01)
NRBC BLD-RTO: 0 PER 100 WBC
OPIATES UR QL: NEGATIVE
PCP UR QL: NEGATIVE
PH UR STRIP: 6.5 [PH] (ref 5–8)
PLATELET # BLD AUTO: 289 K/UL (ref 135–420)
PMV BLD AUTO: 11.5 FL (ref 9.2–11.8)
POTASSIUM SERPL-SCNC: 3.5 MMOL/L (ref 3.5–5.5)
PROT SERPL-MCNC: 7.5 G/DL (ref 6.4–8.2)
PROT UR STRIP-MCNC: 100 MG/DL
RBC # BLD AUTO: 4.15 M/UL (ref 4.2–5.3)
RBC #/AREA URNS HPF: ABNORMAL /HPF (ref 0–5)
SARS-COV-2, COV2: NOT DETECTED
SODIUM SERPL-SCNC: 141 MMOL/L (ref 136–145)
SP GR UR REFRACTOMETRY: 1.01 (ref 1–1.03)
UROBILINOGEN UR QL STRIP.AUTO: 0.2 EU/DL (ref 0.2–1)
WBC # BLD AUTO: 11.3 K/UL (ref 4.6–13.2)
WBC URNS QL MICRO: ABNORMAL /HPF (ref 0–4)

## 2022-12-17 PROCEDURE — 99283 EMERGENCY DEPT VISIT LOW MDM: CPT

## 2022-12-17 PROCEDURE — 80307 DRUG TEST PRSMV CHEM ANLYZR: CPT

## 2022-12-17 PROCEDURE — 81001 URINALYSIS AUTO W/SCOPE: CPT

## 2022-12-17 PROCEDURE — 87636 SARSCOV2 & INF A&B AMP PRB: CPT

## 2022-12-17 PROCEDURE — 85025 COMPLETE CBC W/AUTO DIFF WBC: CPT

## 2022-12-17 PROCEDURE — 80053 COMPREHEN METABOLIC PANEL: CPT

## 2022-12-17 PROCEDURE — 82077 ASSAY SPEC XCP UR&BREATH IA: CPT

## 2022-12-17 RX ORDER — HYDROXYZINE PAMOATE 25 MG/1
25 CAPSULE ORAL
Qty: 20 CAPSULE | Refills: 0 | Status: SHIPPED | OUTPATIENT
Start: 2022-12-17 | End: 2022-12-31

## 2022-12-17 NOTE — ED TRIAGE NOTES
Pt denies HI/SI, but states she \"just wants to go to sleep, and not deal with all the stuff. \" Pt states she's depressed bewcasue she just put her dog down. Requesting audience w/Crisis.  ML

## 2022-12-18 NOTE — BSMART NOTE
BSMART Note: Responded to 's [facility] consult for SI/depression. Crisis counselor to assess as soon as possible pending medical clearance.

## 2022-12-18 NOTE — BSMART NOTE
BSMART Note: Pt requested resources for outpatient psychiatric services due to depression. She denied SI/HI/AH/VH/lacked evidence of delusions and was able to contract for safety with clinician. Pt initially wanted to know if she was going to get admitted to the behavioral health unit to receive a full psychiatric evaluation, and clinician provided psychoeducation regarding the process and criteria of being inpatient for acute psychiatric care.  notified and agrees with disposition to discharge pt home with outpatient resources.

## 2022-12-18 NOTE — ED PROVIDER NOTES
EMERGENCY DEPARTMENT HISTORY AND PHYSICAL EXAM      Date: 2022  Patient Name: Joslyn Griffin      History of Presenting Illness     No chief complaint on file. Location/Duration/Severity/Modifying factors   No chief complaint on file. HPI:  Joslyn Griffin is a 47 y.o. female with history as listed presents with a concern of feeling depressed. The patient stated her mother  July night of this year. She had a mother's dog that she had to put to sleep today. The patient is tearful. She denies suicidal or homicidal ideation. Patient states she is feels like she cannot sleep. She feels depressed. There is no hallucinations. Patient stated she would like outpatient counseling. Associated Symptoms:see ROS      There are no other complaints, changes, or physical findings at this time. PCP: None    Current Outpatient Medications   Medication Sig Dispense Refill    hydrOXYzine pamoate (VistariL) 25 mg capsule Take 1 Capsule by mouth every twelve (12) hours as needed for Anxiety or Sleep for up to 14 days. 20 Capsule 0    acetaminophen-codeine (TYLENOL-CODEINE #3) 300-30 mg per tablet Take 1 Tab by mouth every eight (8) hours. Max Daily Amount: 3 Tabs. Indications: Pain 21 Tab 0    aspirin 81 mg chewable tablet Take 81 mg by mouth daily. ergocalciferol (ERGOCALCIFEROL) 50,000 unit capsule Take 50,000 Units by mouth two (2) days a week. M&F (Patient not taking: Reported on 3/2/2022)      atenolol (TENORMIN) 50 mg tablet Take 50 mg by mouth daily. azaTHIOprine (IMURAN) 50 mg tablet Take 50 mg by mouth daily. predniSONE (DELTASONE) 5 mg tablet Take 5 mg by mouth daily. tacrolimus (PROGRAF) 1 mg capsule Take 2-3 mg by mouth every twelve (12) hours. 3 mg QAM, 2 mg QHS      sertraline (ZOLOFT) 100 mg tablet Take 1 Tab by mouth daily.  17, QTY#90 HARLEEN GUZMÁN  0       Past History     Past Medical History:  Past Medical History:   Diagnosis Date    Anxiety     Bed wetting     Chronic kidney disease     Depression     ESRD (end stage renal disease) (HCC)     Glomerulonephritis     Hematuria     Hyperlipidemia     Hyperparathyroidism due to renal insufficiency (HCC)     Hypertension     Long term (current) use of systemic steroids     Neuropathy [facility]US ([facility])     OAB (overactive bladder)     Osteoporosis     RA (rheumatoid arthritis) ([facility] [address]     Renal transplant recipient 09/30/2008    Nephrologist: Dr. Eliel Levy     Seizures ([facility]     SLE (systemic lupus erythematosus) ([facility] 1995    Synovitis of knee     Tendonitis of knee, left     Tendonitis of knee, right     UTI (urinary tract infection)        Past Surgical History:  Past Surgical History:   Procedure Laterality Date    HX OTHER SURGICAL      Hemodialysis    HX RENAL TRANSPLANT  09/30/2008       Family History:  Family History   Problem Relation Age of Onset    Hypertension Father     Cancer Father     Hypertension Mother     Diabetes Mother     Stroke Mother     Psychiatric Disorder Mother     Psychiatric Disorder Sister        Social History:  Social History     Tobacco Use    Smoking status: Never    Smokeless tobacco: Never   Substance Use Topics    Alcohol use: No    Drug use: No       Allergies: Allergies   Allergen Reactions    Contrast Dye [Iodine] Other (comments)    Iodinated Contrast Media Unknown (comments)    Mycophenolate Mofetil Other (comments)     GI distress-on imuran now    Sulfa (Sulfonamide Antibiotics) Itching and Hives         Review of Systems     Review of Systems   All other systems reviewed and are negative. Physical Exam     Physical Exam  Vitals and nursing note reviewed. Constitutional:       General: She is awake. She is not in acute distress. Appearance: Normal appearance. She is well-developed and well-groomed. She is not ill-appearing, toxic-appearing or diaphoretic. HENT:      Head: Normocephalic and atraumatic.       Right Ear: External ear normal.      Left Ear: External ear normal.      Mouth/Throat:      Mouth: Mucous membranes are moist.      Pharynx: Oropharynx is clear. Uvula midline. No pharyngeal swelling, oropharyngeal exudate, posterior oropharyngeal erythema or uvula swelling. Eyes:      General: Lids are normal. Vision grossly intact. No scleral icterus. Right eye: No discharge. Left eye: No discharge. Extraocular Movements: Extraocular movements intact. Conjunctiva/sclera: Conjunctivae normal.      Pupils: Pupils are equal, round, and reactive to light. Neck:      Trachea: Trachea and phonation normal.   Cardiovascular:      Rate and Rhythm: Normal rate and regular rhythm. Pulses: Normal pulses. Heart sounds: Normal heart sounds, S1 normal and S2 normal. No murmur heard. No friction rub. No gallop. Pulmonary:      Effort: Pulmonary effort is normal. No respiratory distress. Breath sounds: Normal breath sounds and air entry. No stridor. No wheezing, rhonchi or rales. Chest:      Chest wall: No tenderness. Abdominal:      General: Bowel sounds are normal. There is no distension. Palpations: Abdomen is soft. There is no mass. Tenderness: There is no abdominal tenderness. There is no guarding. Genitourinary:     Vagina: No vaginal discharge. Musculoskeletal:         General: No swelling, tenderness, deformity or signs of injury. Normal range of motion. Right shoulder: Normal.      Left shoulder: Normal.      Right upper arm: Normal.      Left upper arm: Normal.      Right elbow: Normal.      Left elbow: Normal.      Right forearm: Normal.      Left forearm: Normal.      Right wrist: Normal. Normal pulse. Left wrist: Normal. Normal pulse. Right hand: Normal. Normal capillary refill. Left hand: Normal. Normal capillary refill. Cervical back: Full passive range of motion without pain, normal range of motion and neck supple. No rigidity or tenderness.       Right lower leg: Normal. No edema. Left lower leg: Normal. No edema. Right ankle: Normal.      Left ankle: Normal.   Skin:     General: Skin is warm and dry. Capillary Refill: Capillary refill takes less than 2 seconds. Coloration: Skin is not jaundiced or pale. Findings: No bruising, erythema, lesion or rash. Neurological:      General: No focal deficit present. Mental Status: She is alert and oriented to person, place, and time. Mental status is at baseline. GCS: GCS eye subscore is 4. GCS verbal subscore is 5. GCS motor subscore is 6. Cranial Nerves: Cranial nerves 2-12 are intact. No cranial nerve deficit or facial asymmetry. Sensory: Sensation is intact. No sensory deficit. Motor: Motor function is intact. No weakness. Coordination: Coordination is intact. Coordination normal.      Gait: Gait is intact. Psychiatric:         Attention and Perception: Attention and perception normal.         Mood and Affect: Mood normal. Affect is tearful. Speech: Speech normal.         Behavior: Behavior normal. Behavior is cooperative. Thought Content: Thought content normal. Thought content does not include homicidal or suicidal ideation. Thought content does not include homicidal or suicidal plan.          Cognition and Memory: Cognition and memory normal.         Judgment: Judgment normal.       Lab and Diagnostic Study Results     Labs -  Recent Results (from the past 24 hour(s))   METABOLIC PANEL, COMPREHENSIVE    Collection Time: 12/17/22  5:45 PM   Result Value Ref Range    Sodium 141 136 - 145 mmol/L    Potassium 3.5 3.5 - 5.5 mmol/L    Chloride 111 100 - 111 mmol/L    CO2 23 21 - 32 mmol/L    Anion gap 7 3.0 - 18 mmol/L    Glucose 131 (H) 74 - 99 mg/dL    BUN 36 (H) 7.0 - 18 MG/DL    Creatinine 1.77 (H) 0.6 - 1.3 MG/DL    BUN/Creatinine ratio 20 12 - 20      eGFR 34 (L) >60 ml/min/1.73m2    Calcium 9.4 8.5 - 10.1 MG/DL    Bilirubin, total 0.7 0.2 - 1.0 MG/DL ALT (SGPT) 12 (L) 13 - 56 U/L    AST (SGOT) 7 (L) 10 - 38 U/L    Alk. phosphatase 88 45 - 117 U/L    Protein, total 7.5 6.4 - 8.2 g/dL    Albumin 3.3 (L) 3.4 - 5.0 g/dL    Globulin 4.2 (H) 2.0 - 4.0 g/dL    A-G Ratio 0.8 0.8 - 1.7     CBC WITH AUTOMATED DIFF    Collection Time: 12/17/22  5:45 PM   Result Value Ref Range    WBC 11.3 4.6 - 13.2 K/uL    RBC 4.15 (L) 4.20 - 5.30 M/uL    HGB 11.6 (L) 12.0 - 16.0 g/dL    HCT 32.3 (L) 35.0 - 45.0 %    MCV 77.8 (L) 78.0 - 100.0 FL    MCH 28.0 24.0 - 34.0 PG    MCHC 35.9 31.0 - 37.0 g/dL    RDW 13.5 11.6 - 14.5 %    PLATELET 631 026 - 496 K/uL    MPV 11.5 9.2 - 11.8 FL    NRBC 0.0 0  WBC    ABSOLUTE NRBC 0.00 0.00 - 0.01 K/uL    NEUTROPHILS 78 (H) 40 - 73 %    LYMPHOCYTES 11 (L) 21 - 52 %    MONOCYTES 8 3 - 10 %    EOSINOPHILS 2 0 - 5 %    BASOPHILS 1 0 - 2 %    IMMATURE GRANULOCYTES 2 (H) 0.0 - 0.5 %    ABS. NEUTROPHILS 8.8 (H) 1.8 - 8.0 K/UL    ABS. LYMPHOCYTES 1.3 0.9 - 3.6 K/UL    ABS. MONOCYTES 0.9 0.05 - 1.2 K/UL    ABS. EOSINOPHILS 0.2 0.0 - 0.4 K/UL    ABS. BASOPHILS 0.1 0.0 - 0.1 K/UL    ABS. IMM.  GRANS. 0.2 (H) 0.00 - 0.04 K/UL    DF AUTOMATED     ETHYL ALCOHOL    Collection Time: 12/17/22  5:45 PM   Result Value Ref Range    ALCOHOL(ETHYL),SERUM <3 0 - 3 MG/DL   COVID-19 WITH INFLUENZA A/B    Collection Time: 12/17/22  5:45 PM   Result Value Ref Range    SARS-CoV-2 by PCR Not detected NOTD      Influenza A by PCR Not detected NOTD      Influenza B by PCR Not detected NOTD     URINALYSIS W/MICROSCOPIC    Collection Time: 12/17/22  6:00 PM   Result Value Ref Range    Color YELLOW      Appearance CLEAR      Specific gravity 1.011 1.005 - 1.030      pH (UA) 6.5 5.0 - 8.0      Protein 100 (A) NEG mg/dL    Glucose Negative NEG mg/dL    Ketone Negative NEG mg/dL    Bilirubin Negative NEG      Blood TRACE (A) NEG      Urobilinogen 0.2 0.2 - 1.0 EU/dL    Nitrites Negative NEG      Leukocyte Esterase TRACE (A) NEG      WBC 21 to 35 0 - 4 /hpf    RBC 0 to 3 0 - 5 /hpf Epithelial cells FEW 0 - 5 /lpf    Bacteria 4+ (A) NEG /hpf   DRUG SCREEN, URINE    Collection Time: 22  6:00 PM   Result Value Ref Range    BENZODIAZEPINES Negative NEG      BARBITURATES Negative NEG      THC (TH-CANNABINOL) Negative NEG      OPIATES Negative NEG      PCP(PHENCYCLIDINE) Negative NEG      COCAINE Negative NEG      AMPHETAMINES Negative NEG      METHADONE Negative NEG      HDSCOM (NOTE)          Radiologic Studies -   No orders to display         Procedures and Critical Care       Performed by: Ashely Chapa MD    Procedures         Ashley Chapa MD    Medical Decision Making and ED Course   - I am the first and primary provider for this patient AND AM THE PRIMARY PROVIDER OF RECORD. - I reviewed the vital signs, available nursing notes, past medical history, past surgical history, family history and social history. - Initial assessment performed. The patients presenting problems have been discussed, and the staff are in agreement with the care plan formulated and outlined with them. I have encouraged them to ask questions as they arise throughout their visit. Vital Signs-Reviewed the patient's vital signs. Patient Vitals for the past 12 hrs:   Temp Pulse Resp BP SpO2   22 1703 98.3 °F (36.8 °C) 75 18 (!) 161/88 99 %         Provider Notes (Medical Decision Making):     MDM  Number of Diagnoses or Management Options  Anemia, unspecified type  Chronic renal impairment, unspecified CKD stage  Grief reaction  Diagnosis management comments: The presented to the emergency department tearful. She states she had a put her mother's dog to sleep today. Her mother  in July of this year. The patient is not homicidal suicidal.  The patient spoke with the on-call crisis provider. The patient was given resources for out patient counseling for depression and/or grief.   The patient might benefit from a brief prescription for Vistaril to assist with anxiety and sleep as needed. She is to follow-up also with her primary care provider to monitor her chronic anemia and renal insufficiency. The patient is encouraged to return to the emergency department for any worsening or concerning symptoms. At time of disposition she is stable and in no acute distress or obvious discomfort. ED Course:          ------------------------------------------------------------------------------------------------------------        Consultations:       Consultations:       Disposition         Discharged      Diagnosis     Clinical Impression:   1. Grief reaction    2. Anemia, unspecified type    3.  Chronic renal impairment, unspecified CKD stage        Attestations:    Michael Flores MD

## 2022-12-18 NOTE — DISCHARGE INSTRUCTIONS
1.  Make an appointment with community service Board and a PCP. 2.  Take medications as prescribed.   3.  Return to the emergency department for any emergency

## 2023-05-30 ENCOUNTER — HOSPITAL ENCOUNTER (INPATIENT)
Facility: HOSPITAL | Age: 53
LOS: 2 days | Discharge: HOME | DRG: 885 | End: 2023-06-02
Attending: EMERGENCY MEDICINE | Admitting: PSYCHIATRY & NEUROLOGY
Payer: MEDICARE

## 2023-05-30 DIAGNOSIS — R45.851 SUICIDAL IDEATION: Primary | ICD-10-CM

## 2023-05-30 DIAGNOSIS — N30.01 ACUTE CYSTITIS WITH HEMATURIA: ICD-10-CM

## 2023-05-30 DIAGNOSIS — Z94.0 RENAL TRANSPLANT RECIPIENT: ICD-10-CM

## 2023-05-30 LAB
AMPHET UR QL SCN: NEGATIVE
ANION GAP SERPL CALC-SCNC: 4 MMOL/L (ref 3–18)
APPEARANCE UR: ABNORMAL
BACTERIA URNS QL MICRO: ABNORMAL /HPF
BARBITURATES UR QL SCN: NEGATIVE
BASOPHILS # BLD: 0 K/UL (ref 0–0.1)
BASOPHILS NFR BLD: 0 % (ref 0–2)
BENZODIAZ UR QL: NEGATIVE
BILIRUB UR QL: NEGATIVE
BUN SERPL-MCNC: 37 MG/DL (ref 7–18)
BUN/CREAT SERPL: 18 (ref 12–20)
CALCIUM SERPL-MCNC: 9.9 MG/DL (ref 8.5–10.1)
CANNABINOIDS UR QL SCN: NEGATIVE
CHLORIDE SERPL-SCNC: 110 MMOL/L (ref 100–111)
CO2 SERPL-SCNC: 24 MMOL/L (ref 21–32)
COCAINE UR QL SCN: NEGATIVE
COLOR UR: YELLOW
CREAT SERPL-MCNC: 2.1 MG/DL (ref 0.6–1.3)
DIFFERENTIAL METHOD BLD: ABNORMAL
EOSINOPHIL # BLD: 0 K/UL (ref 0–0.4)
EOSINOPHIL NFR BLD: 0 % (ref 0–5)
EPITH CASTS URNS QL MICRO: ABNORMAL /LPF (ref 0–5)
ERYTHROCYTE [DISTWIDTH] IN BLOOD BY AUTOMATED COUNT: 13.7 % (ref 11.6–14.5)
ETHANOL SERPL-MCNC: <3 MG/DL (ref 0–3)
FLUAV RNA SPEC QL NAA+PROBE: NOT DETECTED
FLUBV RNA SPEC QL NAA+PROBE: NOT DETECTED
GLUCOSE SERPL-MCNC: 129 MG/DL (ref 74–99)
GLUCOSE UR STRIP.AUTO-MCNC: NEGATIVE MG/DL
HCG UR QL: NEGATIVE
HCT VFR BLD AUTO: 37.2 % (ref 35–45)
HGB BLD-MCNC: 13 G/DL (ref 12–16)
HGB UR QL STRIP: ABNORMAL
IMM GRANULOCYTES # BLD AUTO: 0.1 K/UL (ref 0–0.04)
IMM GRANULOCYTES NFR BLD AUTO: 1 % (ref 0–0.5)
KETONES UR QL STRIP.AUTO: NEGATIVE MG/DL
LEUKOCYTE ESTERASE UR QL STRIP.AUTO: ABNORMAL
LYMPHOCYTES # BLD: 0.6 K/UL (ref 0.9–3.6)
LYMPHOCYTES NFR BLD: 4 % (ref 21–52)
Lab: NORMAL
MCH RBC QN AUTO: 28.3 PG (ref 24–34)
MCHC RBC AUTO-ENTMCNC: 34.9 G/DL (ref 31–37)
MCV RBC AUTO: 81 FL (ref 78–100)
METHADONE UR QL: NEGATIVE
MONOCYTES # BLD: 0.6 K/UL (ref 0.05–1.2)
MONOCYTES NFR BLD: 4 % (ref 3–10)
NEUTS SEG # BLD: 12.4 K/UL (ref 1.8–8)
NEUTS SEG NFR BLD: 90 % (ref 40–73)
NITRITE UR QL STRIP.AUTO: NEGATIVE
NRBC # BLD: 0 K/UL (ref 0–0.01)
NRBC BLD-RTO: 0 PER 100 WBC
OPIATES UR QL: NEGATIVE
PCP UR QL: NEGATIVE
PH UR STRIP: 6 (ref 5–8)
PLATELET # BLD AUTO: 270 K/UL (ref 135–420)
PMV BLD AUTO: 12.1 FL (ref 9.2–11.8)
POTASSIUM SERPL-SCNC: 4.1 MMOL/L (ref 3.5–5.5)
PROT UR STRIP-MCNC: 300 MG/DL
RBC # BLD AUTO: 4.59 M/UL (ref 4.2–5.3)
RBC #/AREA URNS HPF: ABNORMAL /HPF (ref 0–5)
SARS-COV-2 RNA RESP QL NAA+PROBE: NOT DETECTED
SODIUM SERPL-SCNC: 138 MMOL/L (ref 136–145)
SP GR UR REFRACTOMETRY: 1.01 (ref 1–1.03)
UROBILINOGEN UR QL STRIP.AUTO: 0.2 EU/DL (ref 0.2–1)
WBC # BLD AUTO: 13.7 K/UL (ref 4.6–13.2)
WBC URNS QL MICRO: ABNORMAL /HPF (ref 0–4)

## 2023-05-30 PROCEDURE — 87186 SC STD MICRODIL/AGAR DIL: CPT

## 2023-05-30 PROCEDURE — 81001 URINALYSIS AUTO W/SCOPE: CPT

## 2023-05-30 PROCEDURE — 85025 COMPLETE CBC W/AUTO DIFF WBC: CPT

## 2023-05-30 PROCEDURE — 87077 CULTURE AEROBIC IDENTIFY: CPT

## 2023-05-30 PROCEDURE — 80048 BASIC METABOLIC PNL TOTAL CA: CPT

## 2023-05-30 PROCEDURE — 80076 HEPATIC FUNCTION PANEL: CPT

## 2023-05-30 PROCEDURE — 87636 SARSCOV2 & INF A&B AMP PRB: CPT

## 2023-05-30 PROCEDURE — 81025 URINE PREGNANCY TEST: CPT

## 2023-05-30 PROCEDURE — 87086 URINE CULTURE/COLONY COUNT: CPT

## 2023-05-30 PROCEDURE — 80307 DRUG TEST PRSMV CHEM ANLYZR: CPT

## 2023-05-30 PROCEDURE — 96374 THER/PROPH/DIAG INJ IV PUSH: CPT

## 2023-05-30 PROCEDURE — 2580000003 HC RX 258: Performed by: EMERGENCY MEDICINE

## 2023-05-30 PROCEDURE — 82077 ASSAY SPEC XCP UR&BREATH IA: CPT

## 2023-05-30 PROCEDURE — 99285 EMERGENCY DEPT VISIT HI MDM: CPT

## 2023-05-30 PROCEDURE — 6360000002 HC RX W HCPCS: Performed by: EMERGENCY MEDICINE

## 2023-05-30 RX ADMIN — CEFTRIAXONE 1000 MG: 1 INJECTION, POWDER, FOR SOLUTION INTRAMUSCULAR; INTRAVENOUS at 23:09

## 2023-05-30 NOTE — ED TRIAGE NOTES
Patient is tearful in triage , patient complains of depression , SI . Patient states she has new stressors in her life. Patient denies plans at this time. Patient was seen in the ED before but did not take the prescribed medications. Patient states she wants to be admitted for psych treatment.

## 2023-05-31 LAB
ALBUMIN SERPL-MCNC: 3.8 G/DL (ref 3.4–5)
ALBUMIN/GLOB SERPL: 1 (ref 0.8–1.7)
ALP SERPL-CCNC: 108 U/L (ref 45–117)
ALT SERPL-CCNC: 10 U/L (ref 13–56)
AST SERPL-CCNC: 11 U/L (ref 10–38)
BILIRUB DIRECT SERPL-MCNC: 0.1 MG/DL (ref 0–0.2)
BILIRUB SERPL-MCNC: 0.5 MG/DL (ref 0.2–1)
GLOBULIN SER CALC-MCNC: 4 G/DL (ref 2–4)
PROT SERPL-MCNC: 7.8 G/DL (ref 6.4–8.2)

## 2023-05-31 PROCEDURE — 2580000003 HC RX 258: Performed by: EMERGENCY MEDICINE

## 2023-05-31 PROCEDURE — 6360000002 HC RX W HCPCS: Performed by: EMERGENCY MEDICINE

## 2023-05-31 PROCEDURE — 96361 HYDRATE IV INFUSION ADD-ON: CPT

## 2023-05-31 PROCEDURE — 6370000000 HC RX 637 (ALT 250 FOR IP): Performed by: EMERGENCY MEDICINE

## 2023-05-31 PROCEDURE — 6370000000 HC RX 637 (ALT 250 FOR IP): Performed by: PSYCHIATRY & NEUROLOGY

## 2023-05-31 PROCEDURE — 6370000000 HC RX 637 (ALT 250 FOR IP): Performed by: STUDENT IN AN ORGANIZED HEALTH CARE EDUCATION/TRAINING PROGRAM

## 2023-05-31 PROCEDURE — 1240000000 HC EMOTIONAL WELLNESS R&B

## 2023-05-31 RX ORDER — CEFDINIR 300 MG/1
300 CAPSULE ORAL 2 TIMES DAILY
Status: DISCONTINUED | OUTPATIENT
Start: 2023-05-31 | End: 2023-06-02 | Stop reason: HOSPADM

## 2023-05-31 RX ORDER — AMLODIPINE BESYLATE 5 MG/1
10 TABLET ORAL DAILY
Status: DISCONTINUED | OUTPATIENT
Start: 2023-06-01 | End: 2023-06-02 | Stop reason: HOSPADM

## 2023-05-31 RX ORDER — SODIUM CHLORIDE, SODIUM LACTATE, POTASSIUM CHLORIDE, AND CALCIUM CHLORIDE .6; .31; .03; .02 G/100ML; G/100ML; G/100ML; G/100ML
1000 INJECTION, SOLUTION INTRAVENOUS ONCE
Status: COMPLETED | OUTPATIENT
Start: 2023-05-31 | End: 2023-05-31

## 2023-05-31 RX ORDER — AMLODIPINE BESYLATE 5 MG/1
5 TABLET ORAL
Status: COMPLETED | OUTPATIENT
Start: 2023-05-31 | End: 2023-05-31

## 2023-05-31 RX ORDER — CEPHALEXIN 250 MG/1
500 CAPSULE ORAL EVERY 6 HOURS SCHEDULED
Status: DISCONTINUED | OUTPATIENT
Start: 2023-05-31 | End: 2023-05-31

## 2023-05-31 RX ORDER — ATENOLOL 50 MG/1
50 TABLET ORAL DAILY
Status: DISCONTINUED | OUTPATIENT
Start: 2023-05-31 | End: 2023-06-02 | Stop reason: HOSPADM

## 2023-05-31 RX ORDER — AMLODIPINE BESYLATE 5 MG/1
5 TABLET ORAL DAILY
Status: DISCONTINUED | OUTPATIENT
Start: 2023-05-31 | End: 2023-05-31

## 2023-05-31 RX ORDER — PREDNISONE 1 MG/1
5 TABLET ORAL DAILY
Status: DISCONTINUED | OUTPATIENT
Start: 2023-05-31 | End: 2023-06-02 | Stop reason: HOSPADM

## 2023-05-31 RX ORDER — AZATHIOPRINE 50 MG/1
50 TABLET ORAL DAILY
Status: DISCONTINUED | OUTPATIENT
Start: 2023-05-31 | End: 2023-06-02 | Stop reason: HOSPADM

## 2023-05-31 RX ADMIN — PREDNISONE 5 MG: 5 TABLET ORAL at 09:29

## 2023-05-31 RX ADMIN — ATENOLOL 50 MG: 50 TABLET ORAL at 13:00

## 2023-05-31 RX ADMIN — SODIUM CHLORIDE, SODIUM LACTATE, POTASSIUM CHLORIDE, AND CALCIUM CHLORIDE 1000 ML: 600; 310; 30; 20 INJECTION, SOLUTION INTRAVENOUS at 03:00

## 2023-05-31 RX ADMIN — AZATHIOPRINE 50 MG: 50 TABLET ORAL at 12:39

## 2023-05-31 RX ADMIN — CEFDINIR 300 MG: 300 CAPSULE ORAL at 20:56

## 2023-05-31 RX ADMIN — CEPHALEXIN 500 MG: 250 CAPSULE ORAL at 09:29

## 2023-05-31 RX ADMIN — CEPHALEXIN 500 MG: 250 CAPSULE ORAL at 12:40

## 2023-05-31 RX ADMIN — AMLODIPINE BESYLATE 5 MG: 5 TABLET ORAL at 13:00

## 2023-05-31 RX ADMIN — AMLODIPINE BESYLATE 5 MG: 5 TABLET ORAL at 17:14

## 2023-05-31 ASSESSMENT — LIFESTYLE VARIABLES
HOW MANY STANDARD DRINKS CONTAINING ALCOHOL DO YOU HAVE ON A TYPICAL DAY: 1 OR 2
HOW OFTEN DO YOU HAVE A DRINK CONTAINING ALCOHOL: MONTHLY OR LESS

## 2023-05-31 ASSESSMENT — SLEEP AND FATIGUE QUESTIONNAIRES
DO YOU HAVE DIFFICULTY SLEEPING: NO
AVERAGE NUMBER OF SLEEP HOURS: 12
DO YOU USE A SLEEP AID: NO

## 2023-05-31 NOTE — BSMART NOTE
Comprehensive Assessment       Section I - Disposition     Patient to be admitted to the Adult Unit room 121-1 to Dr. Eli Mena. Section II - Integrated Summary    This is a 47year old female who presented in the ED with a complaint of depression. Stated \"I can't talk without crying. \" She reports having end stage renal disease and had a kidney transplant 15 years ago. She also has a history of  HTN and Lupus. She is getting Keflex for a UTI and she is dehydrated and is getting IV fluids. Stated \"I just don't want to be here anymore but I don't know how to do it. \" I don't want to deal with it anymore. \" Stressors include her mother had a stroke and passed in 2022. Her mother's dog that she was caring for passed after that. She got a puppy, but the puppy was sick and she didn't have the money to get him taken care of and she is having a lot of financial difficulties. States she came to the ED last December for her depression and was put on medication for it but didn't take it. She denies thoughts of wanting to harm others and also denies hallucinations. She reports having mood swings at times and sleeping a lot.                          Kwaku Rogers RN

## 2023-05-31 NOTE — H&P
[facility] Family [facility]  FAMILY MEDICINE CONSULT NOTE FOR BEHAVIORAL HEALTH UNIT    Patient:    Saul Waldrop , 47 y.o. female   Room/Bed:  121/01  Admission Date:   5/30/2023  Code status:  No Order      Reason for Consult: Chronic Medical Conditions  Psychiatry Attending Requesting Consult: Dr. General Barney    ASSESSMENT:  Saul Waldrop is a 47 y.o. female w/ a PMH of ESRD s/p cadaver transplant, HTN, SLE, RA presenting for worsening depressed mood and SI who is now admitted to the [place] [address] Unit. Nurse Chaperone during History and Physical:     PLAN:    Depressed Mood  Suicidal Ideation  -Previously on Zoloft but did not take medications  -Management per inpatient psychiatry    ESRD s/p cadaver transplant 15 years ago  SLE  Hx of Glomerulonephritis   HTN, poorly controlled   Nephrologist: Dr. Rojelio Rodriguez ([phone]) - 1 - 0005  Medications: Amlodipine 5mg, Atenolol 50mg, Imuran 50mg, Prednisone 5mg  States stopped taking her HTN medications as she's trying to reduce her total pill count, follows regularly with Dr. Rojelio Rodriguez, last seen in May. ED consulted Renal Transplant, kidney function at her baseline, continue immunosuppressants. - Continue Imuran daily  - Continue Prednisone daily  - Increase Amlodipine to 10mg daily, 5mg dose now  - Restart Atenolol 50mg daily  - Will re-attempt to reach Dr. Spencer Noel office in AM for medication rec, ?tacrolimus  - Strict BP control, may need additional BP medications   - Recheck BMP in AM    UTI in immunocompromised patient  Leukocytosis with neutrophilia  ED consulted Renal transplant, no concern or need for transfer to [facility] to continue immunosuppressives. Patient states she has frequent UTIs, often does not have symptoms. Low concern for complicated UTI or sepsis at this point.  Hx of klebsiella UTI in the past. Denies fever, chills, malaise, N/V, urinary frequency, urgency, dysuria, foul odor/discoloration.    - s/p Ceftriaxone x 1  - Switch to Cefdinir

## 2023-05-31 NOTE — BH NOTE
DENISE Admission Note:Pt. Arrived on the unit with hospital security and emergency room staff while riding in wheelchair. Pt was checked for contraband upon admission on the unit with staff and nurse. Pt was given a copy of the rules upon admission. She did not experience any falls when entering the unit.

## 2023-05-31 NOTE — ED PROVIDER NOTES
EMERGENCY DEPARTMENT HISTORY AND PHYSICAL EXAM      Patient Name: Esteban Owens  MRN: [id number]  YOB: 1970  Provider: Rene Doherty MD  PCP: None None   Time/Date of evaluation: 10:54 PM EDT on 5/30/23    History of Presenting Illness     Chief Complaint   Patient presents with    Mental Health Problem       History Provided By: Patient     History Raya Dodson): Esteban Owens is a 47 y.o. female with a PMHX of a kidney transplant  who presents to the emergency department  by POV C/O depression and suicidal ideation. She states that she has been depressed for years. She was seen in the ED last December but did not get any medications at that time. She is a little bit vague on her suicidal ideation. At first she said yes then she said no then she said yes. She denies any alcohol, drug use, or tobacco use. She is not currently taking any medications and she is not currently in therapy. She states that she was in therapy a long time ago.         Past History     Past Medical History:  Past Medical History:   Diagnosis Date    Anxiety     Bed wetting     Chronic kidney disease     Depression     ESRD (end stage renal disease) ([facility] [address]     Glomerulonephritis     Hematuria     Hyperlipidemia     Hyperparathyroidism due to renal insufficiency ([facility]     Hypertension     Long term (current) use of systemic steroids     Neuropathy associated with MGUS (HCC)     OAB (overactive bladder)     Osteoporosis     RA (rheumatoid arthritis) ([facility] [address]     Renal transplant recipient 09/30/2008    Nephrologist: Dr. Chapin Cedillo     Seizures ([facility]     SLE (systemic lupus erythematosus) ([facility] [address] 1995    Synovitis of knee     Tendonitis of knee, left     Tendonitis of knee, right     UTI (urinary tract infection)        Past Surgical History:  Past Surgical History:   Procedure Laterality Date    KIDNEY TRANSPLANT  09/30/2008    OTHER SURGICAL HISTORY      Hemodialysis       Family History:  Family History   Problem

## 2023-05-31 NOTE — ED NOTES
TRANSFER - OUT REPORT:    Verbal report given to [facility] on [address]  being transferred to [facility] for routine progression of patient care       Report consisted of patient's Situation, Background, Assessment and   Recommendations(SBAR). Information from the following report(s) Nurse Handoff Report, ED SBAR, [facility], and Recent Results was reviewed with the receiving nurse. [place] Assessment: Presents to emergency department  because of falls (Syncope, seizure, or loss of consciousness): No, Age > 79: No, Altered Mental Status, Intoxication with alcohol or substance confusion (Disorientation, impaired judgment, poor safety awaremess, or inability to follow instructions): No, Impaired Mobility: Ambulates or transfers with assistive devices or assistance; Unable to ambulate or transer.: No, Nursing Judgement: No  Lines:   Peripheral IV 05/30/23 Left Antecubital (Active)        Opportunity for questions and clarification was provided. Awaiting transfer pending medication for elevated blood pressure. Will administer and recheck.

## 2023-05-31 NOTE — ED NOTES
Pt belongs placed in locker 3 top.      Labs sent at 2011  Covid 2012     [facility]  05/30/23 2016

## 2023-05-31 NOTE — BSMART NOTE
Social Work Group  5/31/23    Communication Styles Group     Attendance  Refused   participate     Number of participants  5   Time in  3:00pm    Time out  3:45pm    Total Time  45 mins    Interaction  Absent     Interventions/techniques  Informed and encouraged, Provided positive feedback and support       DAYDAY Bob

## 2023-06-01 LAB
ANION GAP SERPL CALC-SCNC: 6 MMOL/L (ref 3–18)
BASOPHILS # BLD: 0 K/UL (ref 0–0.1)
BASOPHILS NFR BLD: 0 % (ref 0–2)
BUN SERPL-MCNC: 36 MG/DL (ref 7–18)
BUN/CREAT SERPL: 17 (ref 12–20)
CALCIUM SERPL-MCNC: 9.4 MG/DL (ref 8.5–10.1)
CHLORIDE SERPL-SCNC: 111 MMOL/L (ref 100–111)
CO2 SERPL-SCNC: 24 MMOL/L (ref 21–32)
CREAT SERPL-MCNC: 2.1 MG/DL (ref 0.6–1.3)
DIFFERENTIAL METHOD BLD: ABNORMAL
EOSINOPHIL # BLD: 0.1 K/UL (ref 0–0.4)
EOSINOPHIL NFR BLD: 1 % (ref 0–5)
ERYTHROCYTE [DISTWIDTH] IN BLOOD BY AUTOMATED COUNT: 13.7 % (ref 11.6–14.5)
GLUCOSE SERPL-MCNC: 128 MG/DL (ref 74–99)
HCT VFR BLD AUTO: 35 % (ref 35–45)
HGB BLD-MCNC: 12.1 G/DL (ref 12–16)
IMM GRANULOCYTES # BLD AUTO: 0.1 K/UL (ref 0–0.04)
IMM GRANULOCYTES NFR BLD AUTO: 1 % (ref 0–0.5)
LYMPHOCYTES # BLD: 1.4 K/UL (ref 0.9–3.6)
LYMPHOCYTES NFR BLD: 12 % (ref 21–52)
MCH RBC QN AUTO: 28.5 PG (ref 24–34)
MCHC RBC AUTO-ENTMCNC: 34.6 G/DL (ref 31–37)
MCV RBC AUTO: 82.4 FL (ref 78–100)
MONOCYTES # BLD: 0.7 K/UL (ref 0.05–1.2)
MONOCYTES NFR BLD: 6 % (ref 3–10)
NEUTS SEG # BLD: 9.1 K/UL (ref 1.8–8)
NEUTS SEG NFR BLD: 80 % (ref 40–73)
NRBC # BLD: 0 K/UL (ref 0–0.01)
NRBC BLD-RTO: 0 PER 100 WBC
PLATELET # BLD AUTO: 236 K/UL (ref 135–420)
PMV BLD AUTO: 12 FL (ref 9.2–11.8)
POTASSIUM SERPL-SCNC: 4.4 MMOL/L (ref 3.5–5.5)
RBC # BLD AUTO: 4.25 M/UL (ref 4.2–5.3)
SODIUM SERPL-SCNC: 141 MMOL/L (ref 136–145)
WBC # BLD AUTO: 11.4 K/UL (ref 4.6–13.2)

## 2023-06-01 PROCEDURE — 6370000000 HC RX 637 (ALT 250 FOR IP): Performed by: PSYCHIATRY & NEUROLOGY

## 2023-06-01 PROCEDURE — 85025 COMPLETE CBC W/AUTO DIFF WBC: CPT

## 2023-06-01 PROCEDURE — 6360000002 HC RX W HCPCS: Performed by: PSYCHIATRY & NEUROLOGY

## 2023-06-01 PROCEDURE — 6370000000 HC RX 637 (ALT 250 FOR IP): Performed by: EMERGENCY MEDICINE

## 2023-06-01 PROCEDURE — 1240000000 HC EMOTIONAL WELLNESS R&B

## 2023-06-01 PROCEDURE — 6370000000 HC RX 637 (ALT 250 FOR IP): Performed by: STUDENT IN AN ORGANIZED HEALTH CARE EDUCATION/TRAINING PROGRAM

## 2023-06-01 PROCEDURE — 6360000002 HC RX W HCPCS: Performed by: EMERGENCY MEDICINE

## 2023-06-01 PROCEDURE — 80048 BASIC METABOLIC PNL TOTAL CA: CPT

## 2023-06-01 PROCEDURE — 36415 COLL VENOUS BLD VENIPUNCTURE: CPT

## 2023-06-01 RX ORDER — FLUOXETINE 10 MG/1
10 CAPSULE ORAL DAILY
Status: DISCONTINUED | OUTPATIENT
Start: 2023-06-01 | End: 2023-06-02

## 2023-06-01 RX ORDER — AZATHIOPRINE 50 MG/1
50 TABLET ORAL DAILY
Status: DISCONTINUED | OUTPATIENT
Start: 2023-06-01 | End: 2023-06-01 | Stop reason: SDUPTHER

## 2023-06-01 RX ORDER — TRAZODONE HYDROCHLORIDE 50 MG/1
25 TABLET ORAL NIGHTLY PRN
Status: DISCONTINUED | OUTPATIENT
Start: 2023-06-01 | End: 2023-06-02 | Stop reason: HOSPADM

## 2023-06-01 RX ORDER — CEPHALEXIN 250 MG/1
500 CAPSULE ORAL EVERY 6 HOURS SCHEDULED
Status: DISCONTINUED | OUTPATIENT
Start: 2023-06-01 | End: 2023-06-01 | Stop reason: SDUPTHER

## 2023-06-01 RX ORDER — HYDROXYZINE PAMOATE 50 MG/1
50 CAPSULE ORAL EVERY 6 HOURS PRN
Status: DISCONTINUED | OUTPATIENT
Start: 2023-06-01 | End: 2023-06-02 | Stop reason: HOSPADM

## 2023-06-01 RX ORDER — ASPIRIN 81 MG/1
81 TABLET, CHEWABLE ORAL DAILY
Status: DISCONTINUED | OUTPATIENT
Start: 2023-06-01 | End: 2023-06-02 | Stop reason: HOSPADM

## 2023-06-01 RX ORDER — TACROLIMUS 1 MG/1
2 CAPSULE ORAL 2 TIMES DAILY
Status: DISCONTINUED | OUTPATIENT
Start: 2023-06-01 | End: 2023-06-02

## 2023-06-01 RX ORDER — HYDROXYZINE PAMOATE 25 MG/1
25 CAPSULE ORAL 3 TIMES DAILY PRN
Status: DISCONTINUED | OUTPATIENT
Start: 2023-06-01 | End: 2023-06-01 | Stop reason: SDUPTHER

## 2023-06-01 RX ORDER — PREDNISONE 1 MG/1
5 TABLET ORAL DAILY
Status: DISCONTINUED | OUTPATIENT
Start: 2023-06-01 | End: 2023-06-01 | Stop reason: SDUPTHER

## 2023-06-01 RX ADMIN — PREDNISONE 5 MG: 5 TABLET ORAL at 08:36

## 2023-06-01 RX ADMIN — ASPIRIN 81 MG CHEWABLE TABLET 81 MG: 81 TABLET CHEWABLE at 19:39

## 2023-06-01 RX ADMIN — CEFDINIR 300 MG: 300 CAPSULE ORAL at 21:43

## 2023-06-01 RX ADMIN — ATENOLOL 50 MG: 50 TABLET ORAL at 08:36

## 2023-06-01 RX ADMIN — AMLODIPINE BESYLATE 10 MG: 5 TABLET ORAL at 08:36

## 2023-06-01 RX ADMIN — SERTRALINE 50 MG: 50 TABLET, FILM COATED ORAL at 18:42

## 2023-06-01 RX ADMIN — CEFDINIR 300 MG: 300 CAPSULE ORAL at 08:36

## 2023-06-01 RX ADMIN — AZATHIOPRINE 50 MG: 50 TABLET ORAL at 08:36

## 2023-06-01 NOTE — GROUP NOTE
Group Therapy Note    Date: 5/31/2023    Group Start Time: 2000  Group End Time: 2030  Group Topic: Medication    [facility] ADULT CHEM DEP    Participation Level:  Active Listener    Participation Quality: Appropriate      Speech:  normal      Thought Process/Content: Logical      Affective Functioning: Congruent      Mood: euthymic      Level of consciousness:  Oriented x4      Response to Learning: Able to verbalize current knowledge/experience      Endings: None Reported    Modes of Intervention: Education      Discipline Responsible: Registered Nurse      Signature:  Dick Baldwin RN

## 2023-06-01 NOTE — BSMART NOTE
BH Biopsychosocial Assessment    Current Level of Psychosocial Functioning     []Independent  []Dependent  []Minimal Assist      Comments:      Psychosocial High Risk Factors (check all that apply)      []Unable to obtain meds                                                               []Chronic illness/pain    []Substance abuse   []Lack of Family Support   []Financial stress   []Isolation   []Inadequate Community Resources  []Suicide attempt(s)  []Not taking medications   []Victim of crime   []Developmental Delay  []Unable to manage personal needs    []Age 72 or older   []  Homeless  []Jennifer transportation   []Readmission within 30 days  []Unemployment  []Traumatic Event    Psychiatric Advanced Directive:    Family to involve in treatment:    Sexual Orientation:      Patient Strengths:    Patient Barriers:     Opiate education provided:    Safety plan:    CMHC/MH history:    Plan of Care:  medication management, group/individual therapies, family meetings, psycho -education, treatment team meetings to assist with stabilization    Initial Discharge Plan:      Clinical Summary:

## 2023-06-01 NOTE — BH NOTE
Pt appeared to have slept for 6.50 hours thus far. Will continue to monitor for safety and changes in behavior.

## 2023-06-01 NOTE — BSMART NOTE
Art Therapy Group Progress Note     PATIENT SCHEDULED FOR GROUP AT:    2:00 PM     GROUP STOP TIME:  2:45 PM     ATTENDANCE: low (2/12 participants)     PARTICIPATION LEVEL: attended, participated in [place], contributed to group discussion     ATTENTION LEVEL: distracted    TOPIC / FOCUS: Create A Safe Space   GOALS:  increase feelings of comfort and support, decrease stress and anxiety, build resilience, practice self-care    SYMBOLIC & THEMATIC CONTENT AS NOTED IN IMAGERY: Pt created a beach scene as her safe space. Her artwork included color, detail, and an expansive sun in the corner. The Pt stated that she would be on the beach watching the surfers, smelling the [facility], and listening to the sounds of the water. The PT said, I don't let fear control me.  Pt had difficulty remaining focused on the topic, would often talk about other people's experiences, and deflect by asking unrelated questions. Pt became labile at the end of group and wanted to ask the doctor about brain chemistry [employer], when you know your supposed to take meds? . PT said she wasn't taking meds for her mental health and didn't want to because she felt like a failure. The AT practiced breathing with the PT to calm her anxiety. Pt was encouraged to create a list of questions so that she would be prepared when she met with the DR. Brad Clinton MA   Art Therapist

## 2023-06-01 NOTE — GROUP NOTE
Group Therapy Note    Date: 5/31/2023    Group Start Time: 1930  Group End Time: 2000  Group Topic:    Participation Level: active      Participation Quality: Appropriate      Speech:  normal      Thought Process/Content: Logical      Affective Functioning: Congruent      Mood: euthymic      Level of consciousness:  Oriented x4      Response to Learning: Able to verbalize current knowledge/experience      Endings: None Reported    Modes of Intervention: Education      Discipline Responsible: Registered Nurse      Signature:  Callie Ferreira RN

## 2023-06-01 NOTE — PLAN OF CARE
Problem: Self Harm/Suicidality  Goal: Will have no self-injury during hospital stay  5/31/2023 2038 by Vasile Montoya RN  Outcome: Progressing  5/31/2023 1833 by Montserrat Whitehead RN  Outcome: Progressing     Problem: Depression/Self Harm  Goal: Effect of psychiatric condition will be minimized and patient will be protected from self harm  5/31/2023 2038 by Vasile Montoya RN  Outcome: Progressing  5/31/2023 1833 by Montserrat Whitehead RN  Outcome: Progressing
Problem: Self Harm/Suicidality  Goal: Will have no self-injury during hospital stay  Description: INTERVENTIONS:  1. Ensure constant observer at bedside with Q15M safety checks  2. Maintain a safe environment  3. Secure patient belongings  4. Ensure family/visitors adhere to safety recommendations  5. Ensure safety tray has been added to patient's diet order  6. Every shift and PRN: Re-assess suicidal risk via Frequent Screener    Outcome: Progressing     Problem: Depression/Self Harm  Goal: Effect of psychiatric condition will be minimized and patient will be protected from self harm  Description: INTERVENTIONS:  1. Assess impact of patient's symptoms on level of functioning, self care needs and offer support as indicated  2. Assess patient/family knowledge of depression, impact on illness and need for teaching  3. Provide emotional support, presence and reassurance  4. Assess for possible suicidal thoughts or ideation. If patient expresses suicidal thoughts or statements do not leave alone, initiate Suicide Precautions, move to a room close to the nursing station and obtain sitter  5. Initiate consults as appropriate with Mental Health Professional, Spiritual Care, Psychosocial CNS, and consider a recommendation to the LIP for a Psychiatric Consultation  Outcome: Progressing    7A-7P: NEW ADMISSION    49year-old.  self-identified female admitted through the ER voluntarily for \"depression\". She presented with a tearful and sad affect. Affect is flat. Good eye contact. Speech is clear and coherent. The patient  states that she had not been compliant taking her psych medications and can't stop crying. Triggers? The passing of her mother last year. The passing of her mother's dog last year. She states that she had a miscarriage 1999. Patient had a kidney transplant 2004 and is taking \"anti-rejection medications (immunosupressants). She denies SI/HI/AVH.  Patient states that she  doesn't want to be
Pt alert and oriented x 4 , denies si/hi , hallucinations, but still has depression . Fair eye contact , clear speech . Logical and related withdrawn to room . Med compliant. Euthymic with congruent affect.
the day room. Pt denies SI/HI/SH thoughts at this time and has contracted for safety. Pt denies ALL hallucinations. Pt is medication compliant, no prn medications given at this time. Pt does report increased anxiety as she has never been inpatient in a psych unit. No other concerns at this time, will monitor for safety and comfort.

## 2023-06-01 NOTE — GROUP NOTE
Group Therapy Note    Date: 5/31/2023    Group Start Time: 2000  Group End Time: 2030  Group Topic: Medication    [facility] ADULT CHEM DEP    Fabiola Kellogg RN        Group Therapy Note    Attendees: 6                                             Participation Level:  Active Listener    Participation Quality: Appropriate      Speech:  normal      Thought Process/Content: Logical      Affective Functioning: Congruent      Mood: euthymic      Level of consciousness:  Oriented x4      Response to Learning: Able to verbalize current knowledge/experience      Endings: None Reported    Modes of Intervention: Education      Discipline Responsible: Registered Nurse      Signature:  Luci Ortiz RN

## 2023-06-01 NOTE — BSMART NOTE
Art Therapy Group Progress Note     PATIENT SCHEDULED FOR GROUP AT:    10:30 AM     GROUP STOP TIME:  11:30 AM     ATTENDANCE: Low (6/13 participants)     PARTICIPATION LEVEL: attended    ATTENTION LEVEL: distracted    TOPIC / FOCUS: Draw Anger as a Character     GOALS:  Emotion Identification, modeling emotional communication skills, reflect on anger and reactions to anger, identify coping skills and provide alternative coping options     SYMBOLIC & THEMATIC CONTENT AS NOTED IN IMAGERY:  Pt entered group approximately 15 minutes before ending. Pt stated that when she feels anger, she goes to sleep.       Donovan Bui MA   Art Therapist

## 2023-06-01 NOTE — PROGRESS NOTES
[facility]   BRIEF PROGRESS NOTE      Vitals:    05/31/23 2005   BP: 113/70   Pulse: 70   Resp: 18   Temp: 97 °F (36.1 °C)   SpO2:        Reviewed labs and overnight events. BP much improved overnight with addition of Amlodipine. Called Dr. Nicole Emmanuel office this AM, patient taking: Prednisone 5mg daily, Imuran 50mg daily, Envarsus XR 4mg daily. Leukocytosis resolved. Kidney function stable. Ur Culture pending. Plan  - Discussed with Pharmacy, will restart Envarsus XR 4mg daily, currently stored in pharmacy. - Continue current medications as ordered.     - Follow Urine Cx    Domenico Woo DO, PGY-3  [facility]  6/1/2023, 8:14 AM

## 2023-06-02 VITALS
SYSTOLIC BLOOD PRESSURE: 171 MMHG | WEIGHT: 175 LBS | DIASTOLIC BLOOD PRESSURE: 98 MMHG | TEMPERATURE: 98.2 F | RESPIRATION RATE: 16 BRPM | HEART RATE: 72 BPM | HEIGHT: 65 IN | OXYGEN SATURATION: 97 % | BODY MASS INDEX: 29.16 KG/M2

## 2023-06-02 LAB
ALBUMIN SERPL-MCNC: 3.1 G/DL (ref 3.4–5)
ALBUMIN/GLOB SERPL: 0.8 (ref 0.8–1.7)
ALP SERPL-CCNC: 92 U/L (ref 45–117)
ALT SERPL-CCNC: 9 U/L (ref 13–56)
ANION GAP SERPL CALC-SCNC: 5 MMOL/L (ref 3–18)
AST SERPL-CCNC: 10 U/L (ref 10–38)
BILIRUB SERPL-MCNC: 0.7 MG/DL (ref 0.2–1)
BUN SERPL-MCNC: 37 MG/DL (ref 7–18)
BUN/CREAT SERPL: 21 (ref 12–20)
CALCIUM SERPL-MCNC: 9.2 MG/DL (ref 8.5–10.1)
CHLORIDE SERPL-SCNC: 112 MMOL/L (ref 100–111)
CO2 SERPL-SCNC: 23 MMOL/L (ref 21–32)
CREAT SERPL-MCNC: 1.76 MG/DL (ref 0.6–1.3)
GLOBULIN SER CALC-MCNC: 3.9 G/DL (ref 2–4)
GLUCOSE SERPL-MCNC: 85 MG/DL (ref 74–99)
POTASSIUM SERPL-SCNC: 4.3 MMOL/L (ref 3.5–5.5)
PROT SERPL-MCNC: 7 G/DL (ref 6.4–8.2)
SODIUM SERPL-SCNC: 140 MMOL/L (ref 136–145)
TSH SERPL DL<=0.05 MIU/L-ACNC: 0.57 UIU/ML (ref 0.36–3.74)

## 2023-06-02 PROCEDURE — 6370000000 HC RX 637 (ALT 250 FOR IP): Performed by: STUDENT IN AN ORGANIZED HEALTH CARE EDUCATION/TRAINING PROGRAM

## 2023-06-02 PROCEDURE — 6370000000 HC RX 637 (ALT 250 FOR IP): Performed by: PSYCHIATRY & NEUROLOGY

## 2023-06-02 PROCEDURE — 6360000002 HC RX W HCPCS: Performed by: PSYCHIATRY & NEUROLOGY

## 2023-06-02 PROCEDURE — 6360000002 HC RX W HCPCS: Performed by: EMERGENCY MEDICINE

## 2023-06-02 PROCEDURE — 84443 ASSAY THYROID STIM HORMONE: CPT

## 2023-06-02 PROCEDURE — 6370000000 HC RX 637 (ALT 250 FOR IP): Performed by: EMERGENCY MEDICINE

## 2023-06-02 PROCEDURE — 80053 COMPREHEN METABOLIC PANEL: CPT

## 2023-06-02 PROCEDURE — 36415 COLL VENOUS BLD VENIPUNCTURE: CPT

## 2023-06-02 RX ORDER — CEFDINIR 300 MG/1
300 CAPSULE ORAL 2 TIMES DAILY
Qty: 16 CAPSULE | Refills: 0 | Status: SHIPPED | OUTPATIENT
Start: 2023-06-02 | End: 2023-06-10

## 2023-06-02 RX ADMIN — CEFDINIR 300 MG: 300 CAPSULE ORAL at 09:12

## 2023-06-02 RX ADMIN — AMLODIPINE BESYLATE 10 MG: 5 TABLET ORAL at 09:12

## 2023-06-02 RX ADMIN — PREDNISONE 5 MG: 5 TABLET ORAL at 09:13

## 2023-06-02 RX ADMIN — SERTRALINE 50 MG: 50 TABLET, FILM COATED ORAL at 09:13

## 2023-06-02 RX ADMIN — ATENOLOL 50 MG: 50 TABLET ORAL at 09:14

## 2023-06-02 RX ADMIN — AZATHIOPRINE 50 MG: 50 TABLET ORAL at 09:13

## 2023-06-02 RX ADMIN — ASPIRIN 81 MG CHEWABLE TABLET 81 MG: 81 TABLET CHEWABLE at 09:12

## 2023-06-02 NOTE — PROGRESS NOTES
Behavioral Services  Medicare Certification Upon Admission    I certify that this patient's inpatient psychiatric hospital admission is medically necessary for:      [x] Treatment which could reasonably be expected to improve this patient's condition,       [] For diagnostic study;     AND     [x] The inpatient psychiatric services are provided while the individual is under the care of a physician and are included in the individualized plan of care.     Estimated length of stay/service 2 days    Plan for post-hospital care O-P referral    Electronically signed by Robin Shankar MD on 6/2/2023 at 10:44 AM

## 2023-06-02 NOTE — BSMART NOTE
Art Therapy Group Progress Note     PATIENT SCHEDULED FOR GROUP AT:    10:00 AM    GROUP STOP TIME:  11:00 AM     ATTENDANCE: low (3/13 participants)     PARTICIPATION LEVEL: attended, participated in art making, shared during discussion     ATTENTION LEVEL:  distracted, cooperative, followed directions    TOPIC / FOCUS: Mindfulness - Motivational Word Mandala     GOALS:  identify current feelings, healthy emotional management, practicing positive coping skills     SYMBOLIC & THEMATIC CONTENT AS NOTED IN IMAGERY: PT reported she was feeling anxious and chose to focus on the now. Pt stated that focusing on the now would help enjoy the ride and smell the roses.  PT mandala was colorful and color filled. The Pt's coloring appeared to be shaky, suggesting some fine motor coordination issues, which may suggest some problematic alcohol consumption. Pt's writing further supported fine motor skill issues. Pt had difficultly writing on a line, with words landing above and below the line. The Pt's text shifted in font and legibility as well. PT had difficulty focusing on one mantra and wrote life's a garden in her mandala middle. PT stated that she felt much calmer after creating artwork. AT notified DR of motor skill decompensation.      Darshan Cardona MA   Art Therapist

## 2023-06-02 NOTE — DISCHARGE SUMMARY
[facility]    Name:  Neisha Roberts  MR#:   [id number]  :  1970  ACCOUNT #:  [de-identified]  ADMIT DATE:  2023  DISCHARGE DATE:  2023    IDENTIFYING DATA:  The patient is a 71-year-old  white female, resident of [place], [place], who lives alone and is covered by [place] [employer]. The patient was admitted after presenting to emergency room voicing suicidal ideas with worsening of depression. She does have history of depression, had been on antidepressants for 10 years and then off for 10 years. She had previously been treated with Zoloft 50 mg daily by me as an outpatient under her maiden name. She had  in , father  , mother had a stroke in  and  in . Mother was seeing Dr. Ulises Millan for a period of time, and she would take mother to see him. There are legal problems in that mother had taken the patient's name off of the checking account at the urging of her caretaker, and after the death, the  will not give an accounting of the money. This is proving to be a legal problem, so that she and the other members of the family who are inheritors are not finding out what happened. She said she was caring for mother's dog who got sick and  in 2022, which made her more depressed. She had come to the hospital and asked for antidepressants which she was given, but did not take. She got increasingly depressed over the past 6 months with suicidal ideas. She described crying spells, excessive sleep, poor motivation, episodes of variable energy including going high and low. She does do some volunteer work which helps her feel better. She came to the hospital with thoughts of suicide, but she really had called insisting that she needed treatment and was having trouble getting access.     Laboratory testing included a basic metabolic panel with elevated BUN consistent with her kidney transplant, this was 36 mg/dL

## 2023-06-02 NOTE — BH NOTE
Pt appeared to have slept for 5.25 hours thus far; pleasant while awake. She was compliant with her blood draw this morning. Will continue to monitor for safety and changes in behavior.

## 2023-06-02 NOTE — DISCHARGE INSTRUCTIONS
[address] [address] #[phone] T  Therapist: JERRICA Hester LCSW on Monday 6/5/23 at 4;30 pm  Please arrive 4pm to register Medication Management: Dr Beth Sutherland on Tuesday 6/6/23 at 4:45pm

## 2023-06-02 NOTE — BH NOTE
Patient refused Prozac and Prograf. Patient stated the doctor discussed sertraline and only wanted to take what was discussed. Patient also refused Prograf. Stated she does not take Prograf. Stated she only takes Envarus. Patient was encouraged to discuss her medication regimen with the provider in the morning.

## 2023-06-02 NOTE — PROGRESS NOTES
S: patient ready to go home. No UTI symptoms and history of recurrent bactiuria without symptoms. No other complaints  O:elevated BP today. Yesterday fine  NAD but appears strained in stating she is doing well. A/P  Depressed Mood  Suicidal Ideation  -Management per inpatient psychiatry     Hx of Glomerulonephritis ESRD s/p cadaver transplant 15 years ago  Nephrologist: Dr. Lizzy Epps ([phone]) - 1 - 0005  Back to baseline Cr. SLE: back on immuno medications  Recommend establishing with rheumatology post dc. HTN, erratically controlled   - Amlodipine to 10mg daily, 5mg dose now  - Atenolol 50mg daily   UTI in immunocompromised patient  Leukocytosis with neutrophilia  - Switch to Cefdinir 300mg BID for total 5 days of treatment for easier dosing  - gram negative urine culture  HM  - Needs pap smear  - Needs mammogram  - Recommend COVID booster  - Recommend shingles vaccine  - CRC screening UTD per patient   Will follow up PFM post dc.   Numbers provided to patient

## 2023-06-02 NOTE — BSMART NOTE
SW Contact:      Pt Contact:pt felt remotivated now with her meds back on track & learning briefly some CBT principles. reviewed d/c and safety plan to include:     Outpt follow up will be with:  [facility]  [address] [facility] [address]  [address][phone]  Therapist: JERRICA Unger LCSW on  Monday 6/5/23 at 4;30pm                   Please arrive 4pm to register  Medication Management: Dr Vita Law on Tuesday 6/6/23 at 4:45pm      & tx team updated

## 2023-06-02 NOTE — PROGRESS NOTES
Pt received discharge instructions, prescriptions, and emergency numbers. Pt completed suicide safety plan with staff. Pt completed satisfaction survey. All personal belongings returned to pt. Pt encouraged to follow-up with outpatient resources and to call with any questions or concerns.       Outpt follow up will be with:  [facility]  [address], [address]  [address][phone]  Therapist: JERRICA Gar LCSW on  Monday 6/5/23 at 4;30pm                   Please arrive 4pm to register  Medication Management: Dr Estela Castro on Tuesday 6/6/23 at 4:45pm

## 2023-06-03 LAB
BACTERIA SPEC CULT: ABNORMAL
CC UR VC: ABNORMAL
SERVICE CMNT-IMP: ABNORMAL

## 2024-05-07 ENCOUNTER — HOSPITAL ENCOUNTER (EMERGENCY)
Facility: HOSPITAL | Age: 54
Discharge: HOME | End: 2024-05-08
Payer: MEDICARE

## 2024-05-07 VITALS
SYSTOLIC BLOOD PRESSURE: 140 MMHG | DIASTOLIC BLOOD PRESSURE: 93 MMHG | RESPIRATION RATE: 18 BRPM | TEMPERATURE: 98.3 F | HEART RATE: 91 BPM | OXYGEN SATURATION: 97 % | WEIGHT: 165 LBS | BODY MASS INDEX: 27.49 KG/M2 | HEIGHT: 65 IN

## 2024-05-07 DIAGNOSIS — M25.562 PAIN IN BOTH KNEES, UNSPECIFIED CHRONICITY: ICD-10-CM

## 2024-05-07 DIAGNOSIS — M25.561 PAIN IN BOTH KNEES, UNSPECIFIED CHRONICITY: ICD-10-CM

## 2024-05-07 DIAGNOSIS — M32.9 EXACERBATION OF SYSTEMIC LUPUS (HCC): Primary | ICD-10-CM

## 2024-05-07 LAB
ANION GAP SERPL CALC-SCNC: 6 MMOL/L (ref 3–18)
BASOPHILS # BLD: 0.1 K/UL (ref 0–0.1)
BASOPHILS NFR BLD: 1 % (ref 0–2)
BUN SERPL-MCNC: 41 MG/DL (ref 7–18)
BUN/CREAT SERPL: 18 (ref 12–20)
CALCIUM SERPL-MCNC: 9.6 MG/DL (ref 8.5–10.1)
CHLORIDE SERPL-SCNC: 106 MMOL/L (ref 100–111)
CO2 SERPL-SCNC: 23 MMOL/L (ref 21–32)
CREAT SERPL-MCNC: 2.27 MG/DL (ref 0.6–1.3)
DIFFERENTIAL METHOD BLD: ABNORMAL
EOSINOPHIL # BLD: 0 K/UL (ref 0–0.4)
EOSINOPHIL NFR BLD: 0 % (ref 0–5)
ERYTHROCYTE [DISTWIDTH] IN BLOOD BY AUTOMATED COUNT: 14.4 % (ref 11.6–14.5)
GLUCOSE SERPL-MCNC: 213 MG/DL (ref 74–99)
HCT VFR BLD AUTO: 35 % (ref 35–45)
HGB BLD-MCNC: 12.4 G/DL (ref 12–16)
IMM GRANULOCYTES # BLD AUTO: 0.6 K/UL (ref 0–0.04)
IMM GRANULOCYTES NFR BLD AUTO: 4 % (ref 0–0.5)
LYMPHOCYTES # BLD: 0.5 K/UL (ref 0.9–3.6)
LYMPHOCYTES NFR BLD: 4 % (ref 21–52)
MCH RBC QN AUTO: 27.3 PG (ref 24–34)
MCHC RBC AUTO-ENTMCNC: 35.4 G/DL (ref 31–37)
MCV RBC AUTO: 77.1 FL (ref 78–100)
MONOCYTES # BLD: 0.3 K/UL (ref 0.05–1.2)
MONOCYTES NFR BLD: 2 % (ref 3–10)
NEUTS SEG # BLD: 11.4 K/UL (ref 1.8–8)
NEUTS SEG NFR BLD: 90 % (ref 40–73)
NRBC # BLD: 0 K/UL (ref 0–0.01)
NRBC BLD-RTO: 0 PER 100 WBC
PLATELET # BLD AUTO: 384 K/UL (ref 135–420)
PMV BLD AUTO: 11 FL (ref 9.2–11.8)
POTASSIUM SERPL-SCNC: 4.4 MMOL/L (ref 3.5–5.5)
RBC # BLD AUTO: 4.54 M/UL (ref 4.2–5.3)
SODIUM SERPL-SCNC: 135 MMOL/L (ref 136–145)
WBC # BLD AUTO: 12.7 K/UL (ref 4.6–13.2)

## 2024-05-07 PROCEDURE — 85025 COMPLETE CBC W/AUTO DIFF WBC: CPT

## 2024-05-07 PROCEDURE — 96374 THER/PROPH/DIAG INJ IV PUSH: CPT

## 2024-05-07 PROCEDURE — 80048 BASIC METABOLIC PNL TOTAL CA: CPT

## 2024-05-07 PROCEDURE — 99284 EMERGENCY DEPT VISIT MOD MDM: CPT

## 2024-05-07 PROCEDURE — 96361 HYDRATE IV INFUSION ADD-ON: CPT

## 2024-05-07 PROCEDURE — 96375 TX/PRO/DX INJ NEW DRUG ADDON: CPT

## 2024-05-07 ASSESSMENT — PAIN SCALES - GENERAL: PAINLEVEL_OUTOF10: 8

## 2024-05-07 ASSESSMENT — PAIN - FUNCTIONAL ASSESSMENT: PAIN_FUNCTIONAL_ASSESSMENT: 0-10

## 2024-05-07 ASSESSMENT — PAIN DESCRIPTION - LOCATION: LOCATION: LEG

## 2024-05-07 ASSESSMENT — PAIN DESCRIPTION - ORIENTATION: ORIENTATION: RIGHT;LEFT

## 2024-05-08 PROCEDURE — 6360000002 HC RX W HCPCS: Performed by: PHYSICIAN ASSISTANT

## 2024-05-08 PROCEDURE — 2580000003 HC RX 258: Performed by: PHYSICIAN ASSISTANT

## 2024-05-08 RX ORDER — 0.9 % SODIUM CHLORIDE 0.9 %
500 INTRAVENOUS SOLUTION INTRAVENOUS ONCE
Status: COMPLETED | OUTPATIENT
Start: 2024-05-08 | End: 2024-05-08

## 2024-05-08 RX ORDER — MORPHINE SULFATE 4 MG/ML
4 INJECTION, SOLUTION INTRAMUSCULAR; INTRAVENOUS
Status: COMPLETED | OUTPATIENT
Start: 2024-05-08 | End: 2024-05-08

## 2024-05-08 RX ORDER — PREDNISONE 5 MG/1
TABLET ORAL
Qty: 21 EACH | Refills: 0 | Status: SHIPPED | OUTPATIENT
Start: 2024-05-08

## 2024-05-08 RX ADMIN — SODIUM CHLORIDE 500 ML: 9 INJECTION, SOLUTION INTRAVENOUS at 00:29

## 2024-05-08 RX ADMIN — WATER 125 MG: 1 INJECTION INTRAMUSCULAR; INTRAVENOUS; SUBCUTANEOUS at 00:28

## 2024-05-08 RX ADMIN — MORPHINE SULFATE 4 MG: 4 INJECTION, SOLUTION INTRAMUSCULAR; INTRAVENOUS at 00:28

## 2024-05-08 ASSESSMENT — ENCOUNTER SYMPTOMS
BACK PAIN: 0
COUGH: 0
EYE DISCHARGE: 0
VOMITING: 0
STRIDOR: 0
NAUSEA: 0
RHINORRHEA: 0
SORE THROAT: 0
SHORTNESS OF BREATH: 0
EYE REDNESS: 0
ABDOMINAL PAIN: 0
WHEEZING: 0

## 2024-05-08 NOTE — ED PROVIDER NOTES
EMERGENCY DEPARTMENT HISTORY AND PHYSICAL EXAM    Date: 5/7/2024  Patient Name: Janette Franklin    History of Presenting Illness     Chief Complaint   Patient presents with    Joint Pain         History Provided By: patient     Chief Complaint: lupus flare, knee pain   Duration: few days  Timing:  acute  Location: bilateral knees   Quality: throbbing  Severity: moderate   Modifying Factors: home meds not helping   Associated Symptoms: knee pain       Additional History (Context): Janette Franklin is a 53 y.o. female with PMH end-stage renal disease, glomerulonephritis, hyperlipidemia, hyperparathyroidism due to renal insufficiency, hypertension, lupus, rheumatoid arthritis, seizures, anxiety, and post renal transplant who presents with complaints of a lupus flare for the past few days.  Patient states she is having pain to her bilateral knees.  She denies fever and chills.  Patient states knee pain is very classic of her lupus flares.  She states she takes 5 mg of prednisone daily and occasionally will increase this dosage if she is experiencing a lupus flare.  Patient only took 5 mg of her prednisone today.  No other complaints are reported at this time    PCP: None, None    No current facility-administered medications for this encounter.     Current Outpatient Medications   Medication Sig Dispense Refill    predniSONE 5 MG (21) TBPK Use as directed 21 each 0    sertraline (ZOLOFT) 50 MG tablet Take 1 tablet by mouth daily 30 tablet 0    Tacrolimus ER 4 MG TB24 Take 1 tablet by mouth daily 1 tablet 0    amLODIPine (NORVASC) 5 MG tablet Take by mouth daily      aspirin 81 MG chewable tablet Take 1 tablet by mouth daily      atenolol (TENORMIN) 50 MG tablet Take 1 tablet by mouth daily      azaTHIOprine (IMURAN) 50 MG tablet Take 1 tablet by mouth daily      ergocalciferol (ERGOCALCIFEROL) 1.25 MG (15998 UT) capsule Take 50,000 Units by mouth      predniSONE (DELTASONE) 5 MG tablet Take 5 mg by mouth daily

## 2025-02-01 ENCOUNTER — HOSPITAL ENCOUNTER (EMERGENCY)
Facility: HOSPITAL | Age: 55
Discharge: HOME | End: 2025-02-01
Attending: EMERGENCY MEDICINE
Payer: MEDICARE

## 2025-02-01 VITALS
SYSTOLIC BLOOD PRESSURE: 135 MMHG | RESPIRATION RATE: 19 BRPM | TEMPERATURE: 98.4 F | HEART RATE: 109 BPM | BODY MASS INDEX: 27.32 KG/M2 | WEIGHT: 164 LBS | OXYGEN SATURATION: 100 % | DIASTOLIC BLOOD PRESSURE: 76 MMHG | HEIGHT: 65 IN

## 2025-02-01 DIAGNOSIS — R11.2 NAUSEA VOMITING AND DIARRHEA: ICD-10-CM

## 2025-02-01 DIAGNOSIS — R19.7 NAUSEA VOMITING AND DIARRHEA: ICD-10-CM

## 2025-02-01 DIAGNOSIS — E86.0 DEHYDRATION: Primary | ICD-10-CM

## 2025-02-01 LAB
ALBUMIN SERPL-MCNC: 3.6 G/DL (ref 3.4–5)
ALBUMIN/GLOB SERPL: 0.8 (ref 0.8–1.7)
ALP SERPL-CCNC: 130 U/L (ref 45–117)
ALT SERPL-CCNC: 15 U/L (ref 13–56)
ANION GAP SERPL CALC-SCNC: 8 MMOL/L (ref 3–18)
AST SERPL-CCNC: 29 U/L (ref 10–38)
BASOPHILS # BLD: 0.02 K/UL (ref 0–0.1)
BASOPHILS NFR BLD: 0.1 % (ref 0–2)
BILIRUB SERPL-MCNC: 0.9 MG/DL (ref 0.2–1)
BUN SERPL-MCNC: 57 MG/DL (ref 7–18)
BUN/CREAT SERPL: 16 (ref 12–20)
CALCIUM SERPL-MCNC: 9.5 MG/DL (ref 8.5–10.1)
CHLORIDE SERPL-SCNC: 104 MMOL/L (ref 100–111)
CO2 SERPL-SCNC: 24 MMOL/L (ref 21–32)
CREAT SERPL-MCNC: 3.54 MG/DL (ref 0.6–1.3)
DIFFERENTIAL METHOD BLD: ABNORMAL
EOSINOPHIL # BLD: 0.13 K/UL (ref 0–0.4)
EOSINOPHIL NFR BLD: 1 % (ref 0–5)
ERYTHROCYTE [DISTWIDTH] IN BLOOD BY AUTOMATED COUNT: 18.5 % (ref 11.6–14.5)
FLUAV RNA SPEC QL NAA+PROBE: NOT DETECTED
FLUBV RNA SPEC QL NAA+PROBE: NOT DETECTED
GLOBULIN SER CALC-MCNC: 4.3 G/DL (ref 2–4)
GLUCOSE SERPL-MCNC: 169 MG/DL (ref 74–99)
HCT VFR BLD AUTO: 31.9 % (ref 35–45)
HGB BLD-MCNC: 10.3 G/DL (ref 12–16)
IMM GRANULOCYTES # BLD AUTO: 0.12 K/UL (ref 0–0.04)
IMM GRANULOCYTES NFR BLD AUTO: 0.9 % (ref 0–0.5)
LYMPHOCYTES # BLD: 0.22 K/UL (ref 0.9–3.6)
LYMPHOCYTES NFR BLD: 1.6 % (ref 21–52)
MCH RBC QN AUTO: 26.3 PG (ref 24–34)
MCHC RBC AUTO-ENTMCNC: 32.3 G/DL (ref 31–37)
MCV RBC AUTO: 81.4 FL (ref 78–100)
MONOCYTES # BLD: 0.47 K/UL (ref 0.05–1.2)
MONOCYTES NFR BLD: 3.5 % (ref 3–10)
NEUTS SEG # BLD: 12.64 K/UL (ref 1.8–8)
NEUTS SEG NFR BLD: 92.9 % (ref 40–73)
NRBC # BLD: 0 K/UL (ref 0–0.01)
NRBC BLD-RTO: 0 PER 100 WBC
PLATELET # BLD AUTO: 169 K/UL (ref 135–420)
PMV BLD AUTO: 11.3 FL (ref 9.2–11.8)
POTASSIUM SERPL-SCNC: 5.2 MMOL/L (ref 3.5–5.5)
PROT SERPL-MCNC: 7.9 G/DL (ref 6.4–8.2)
RBC # BLD AUTO: 3.92 M/UL (ref 4.2–5.3)
SARS-COV-2 RNA RESP QL NAA+PROBE: NOT DETECTED
SODIUM SERPL-SCNC: 136 MMOL/L (ref 136–145)
SOURCE: NORMAL
WBC # BLD AUTO: 13.6 K/UL (ref 4.6–13.2)

## 2025-02-01 PROCEDURE — 80053 COMPREHEN METABOLIC PANEL: CPT

## 2025-02-01 PROCEDURE — 6370000000 HC RX 637 (ALT 250 FOR IP): Performed by: EMERGENCY MEDICINE

## 2025-02-01 PROCEDURE — 85027 COMPLETE CBC AUTOMATED: CPT

## 2025-02-01 PROCEDURE — 96361 HYDRATE IV INFUSION ADD-ON: CPT

## 2025-02-01 PROCEDURE — 87636 SARSCOV2 & INF A&B AMP PRB: CPT

## 2025-02-01 PROCEDURE — 6360000002 HC RX W HCPCS: Performed by: EMERGENCY MEDICINE

## 2025-02-01 PROCEDURE — 96374 THER/PROPH/DIAG INJ IV PUSH: CPT

## 2025-02-01 PROCEDURE — 2580000003 HC RX 258: Performed by: EMERGENCY MEDICINE

## 2025-02-01 PROCEDURE — 99284 EMERGENCY DEPT VISIT MOD MDM: CPT

## 2025-02-01 RX ORDER — 0.9 % SODIUM CHLORIDE 0.9 %
1000 INTRAVENOUS SOLUTION INTRAVENOUS ONCE
Status: COMPLETED | OUTPATIENT
Start: 2025-02-01 | End: 2025-02-01

## 2025-02-01 RX ORDER — LOPERAMIDE HYDROCHLORIDE 2 MG/1
2 CAPSULE ORAL
Status: COMPLETED | OUTPATIENT
Start: 2025-02-01 | End: 2025-02-01

## 2025-02-01 RX ORDER — ONDANSETRON 2 MG/ML
4 INJECTION INTRAMUSCULAR; INTRAVENOUS
Status: COMPLETED | OUTPATIENT
Start: 2025-02-01 | End: 2025-02-01

## 2025-02-01 RX ADMIN — LOPERAMIDE HYDROCHLORIDE 2 MG: 2 CAPSULE ORAL at 10:13

## 2025-02-01 RX ADMIN — SODIUM CHLORIDE 1000 ML: 9 INJECTION, SOLUTION INTRAVENOUS at 05:46

## 2025-02-01 RX ADMIN — ONDANSETRON 4 MG: 2 INJECTION, SOLUTION INTRAMUSCULAR; INTRAVENOUS at 05:45

## 2025-02-01 ASSESSMENT — LIFESTYLE VARIABLES
HOW OFTEN DO YOU HAVE A DRINK CONTAINING ALCOHOL: NEVER
HOW MANY STANDARD DRINKS CONTAINING ALCOHOL DO YOU HAVE ON A TYPICAL DAY: PATIENT DOES NOT DRINK

## 2025-02-01 ASSESSMENT — PAIN - FUNCTIONAL ASSESSMENT: PAIN_FUNCTIONAL_ASSESSMENT: NONE - DENIES PAIN

## 2025-02-01 ASSESSMENT — ENCOUNTER SYMPTOMS
GASTROINTESTINAL NEGATIVE: 1
RESPIRATORY NEGATIVE: 1

## 2025-02-01 NOTE — ED TRIAGE NOTES
Patient arrived EMS from home with c/o n/v/d since midnight. Upon arrival arrival patient alert and oriented x 4. NAD.

## 2025-02-01 NOTE — ED TRIAGE NOTES
[facility] EMERGENCY DEPARTMENT  eMERGENCY dEPARTMENT eNCOUnter      Pt Name: Janette Franklin  MRN: [id number]  Birthdate 1970 of evaluation: 2/1/2025  Provider:Michel Quarles MD    CHIEF COMPLAINT       HPI    Janette Franklin is a 54 y.o. female who has ESRD.  She is 1 month post CABG.  She present to the ER with C/O having N/V/D that stared yesterday morning.  Patient denies having chest pain, SOB, fever, chills.      ROS    Review of Systems   Constitutional: Negative.    HENT: Negative.     Respiratory: Negative.     Cardiovascular: Negative.    Gastrointestinal: Negative.    Genitourinary:  Positive for frequency. Negative for dysuria, flank pain, pelvic pain, vaginal discharge and vaginal pain.   Musculoskeletal:  Negative for myalgias.   Skin: Negative.    Neurological:  Negative for dizziness.   Hematological: Negative.    Psychiatric/Behavioral: Negative.     All other systems reviewed and are negative.      Except as noted above the remainder of the review of systems was reviewed and negative.       PAST MEDICAL HISTORY     Past Medical History:   Diagnosis Date    Anxiety     Bed wetting     Chronic kidney disease     Depression     ESRD (end stage renal disease) ([facility])     Glomerulonephritis     Hematuria     Hyperlipidemia     Hyperparathyroidism due to renal insufficiency ([facility])     Hypertension     Long term (current) use of systemic steroids     Neuropathy associated with MGUS ([facility])     OAB (overactive bladder)     Osteoporosis     RA (rheumatoid arthritis) ([facility])     Renal transplant recipient 09/30/2008    Nephrologist: Dr. Rodrigue Hannah     Seizures ([facility])     SLE (systemic lupus erythematosus) ([facility]) 1995    Synovitis of knee     Tendonitis of knee, left     Tendonitis of knee, right     UTI (urinary tract infection)          SURGICAL HISTORY       Past Surgical History:   Procedure Laterality Date    KIDNEY TRANSPLANT  09/30/2008    OTHER SURGICAL HISTORY      Hemodialysis

## 2025-02-01 NOTE — ED PROVIDER NOTES
Anion Gap 8 3.0 - 18 mmol/L     Glucose 169 (H) 74 - 99 mg/dL     BUN 57 (H) 7.0 - 18 MG/DL     Creatinine 3.54 (H) 0.6 - 1.3 MG/DL     BUN/Creatinine Ratio 16 12 - 20       Est, Glom Filt Rate 15 (L) >60 ml/min/1.73m2     Calcium 9.5 8.5 - 10.1 MG/DL     Total Bilirubin 0.9 0.2 - 1.0 MG/DL     ALT 15 13 - 56 U/L     AST 29 10 - 38 U/L     Alk Phosphatase 130 (H) 45 - 117 U/L     Total Protein 7.9 6.4 - 8.2 g/dL     Albumin 3.6 3.4 - 5.0 g/dL     Globulin 4.3 (H) 2.0 - 4.0 g/dL     Albumin/Globulin Ratio 0.8 0.8 - 1.7     CBC     Collection Time: 02/01/25  6:20 AM   Result Value Ref Range     WBC 13.6 (H) 4.6 - 13.2 K/uL     RBC 3.92 (L) 4.20 - 5.30 M/uL     Hemoglobin 10.3 (L) 12.0 - 16.0 g/dL     Hematocrit 31.9 (L) 35.0 - 45.0 %     MCV 81.4 78.0 - 100.0 FL     MCH 26.3 24.0 - 34.0 PG     MCHC 32.3 31.0 - 37.0 g/dL     RDW 18.5 (H) 11.6 - 14.5 %     Platelets 169 135 - 420 K/uL     MPV 11.3 9.2 - 11.8 FL     Nucleated RBCs 0.0 0  WBC     nRBC 0.00 0.00 - 0.01 K/uL   Automated Differential     Collection Time: 02/01/25  6:20 AM   Result Value Ref Range     Neutrophils % 92.9 (H) 40.0 - 73.0 %     Lymphocytes % 1.6 (L) 21.0 - 52.0 %     Monocytes % 3.5 3.0 - 10.0 %     Eosinophils % 1.0 0.0 - 5.0 %     Basophils % 0.1 0.0 - 2.0 %     Immature Granulocytes % 0.9 (H) 0.0 - 0.5 %     Neutrophils Absolute 12.64 (H) 1.80 - 8.00 K/UL     Lymphocytes Absolute 0.22 (L) 0.90 - 3.60 K/UL     Monocytes Absolute 0.47 0.05 - 1.20 K/UL     Eosinophils Absolute 0.13 0.00 - 0.40 K/UL     Basophils Absolute 0.02 0.00 - 0.10 K/UL     Immature Granulocytes Absolute 0.12 (H) 0.00 - 0.04 K/UL     Differential Type AUTOMATED     COVID-19 & Influenza Combo     Collection Time: 02/01/25  6:30 AM     Specimen: Nasopharyngeal   Result Value Ref Range     Source Nasopharyngeal       SARS-CoV-2, PCR Not detected NOTD       Rapid Influenza A By PCR Not detected NOTD       Rapid Influenza B By PCR Not detected NOTD